# Patient Record
Sex: FEMALE | Race: WHITE | NOT HISPANIC OR LATINO | URBAN - METROPOLITAN AREA
[De-identification: names, ages, dates, MRNs, and addresses within clinical notes are randomized per-mention and may not be internally consistent; named-entity substitution may affect disease eponyms.]

---

## 2017-02-01 ENCOUNTER — OUTPATIENT (OUTPATIENT)
Dept: OUTPATIENT SERVICES | Facility: HOSPITAL | Age: 22
LOS: 1 days | End: 2017-02-01
Payer: MEDICAID

## 2017-02-20 DIAGNOSIS — R69 ILLNESS, UNSPECIFIED: ICD-10-CM

## 2017-09-01 PROCEDURE — G9001: CPT

## 2023-10-03 PROBLEM — Z00.00 ENCOUNTER FOR PREVENTIVE HEALTH EXAMINATION: Status: ACTIVE | Noted: 2023-10-03

## 2023-10-06 ENCOUNTER — NON-APPOINTMENT (OUTPATIENT)
Age: 28
End: 2023-10-06

## 2023-10-06 ENCOUNTER — APPOINTMENT (OUTPATIENT)
Dept: OBGYN | Facility: CLINIC | Age: 28
End: 2023-10-06
Payer: MEDICAID

## 2023-10-06 VITALS
BODY MASS INDEX: 20.38 KG/M2 | HEART RATE: 124 BPM | DIASTOLIC BLOOD PRESSURE: 76 MMHG | SYSTOLIC BLOOD PRESSURE: 110 MMHG | HEIGHT: 63 IN | WEIGHT: 115 LBS

## 2023-10-06 DIAGNOSIS — Z12.4 ENCOUNTER FOR SCREENING FOR MALIGNANT NEOPLASM OF CERVIX: ICD-10-CM

## 2023-10-06 DIAGNOSIS — Z11.51 ENCOUNTER FOR SCREENING FOR HUMAN PAPILLOMAVIRUS (HPV): ICD-10-CM

## 2023-10-06 DIAGNOSIS — Z01.419 ENCOUNTER FOR GYNECOLOGICAL EXAMINATION (GENERAL) (ROUTINE) W/OUT ABNORMAL FINDINGS: ICD-10-CM

## 2023-10-06 PROCEDURE — 76830 TRANSVAGINAL US NON-OB: CPT

## 2023-10-06 PROCEDURE — 99214 OFFICE O/P EST MOD 30 MIN: CPT

## 2023-10-09 LAB
C TRACH RRNA SPEC QL NAA+PROBE: NOT DETECTED
N GONORRHOEA RRNA SPEC QL NAA+PROBE: NOT DETECTED
SOURCE AMPLIFICATION: NORMAL

## 2023-10-18 ENCOUNTER — ASOB RESULT (OUTPATIENT)
Age: 28
End: 2023-10-18

## 2023-10-18 ENCOUNTER — APPOINTMENT (OUTPATIENT)
Dept: OBGYN | Facility: CLINIC | Age: 28
End: 2023-10-18
Payer: MEDICAID

## 2023-10-18 DIAGNOSIS — R10.2 PELVIC AND PERINEAL PAIN: ICD-10-CM

## 2023-10-18 LAB
BILIRUB UR QL STRIP: NORMAL
CLARITY UR: CLEAR
COLLECTION METHOD: NORMAL
GLUCOSE UR-MCNC: NORMAL
HCG UR QL: 0.2 EU/DL
HGB UR QL STRIP.AUTO: NORMAL
KETONES UR-MCNC: NORMAL
LEUKOCYTE ESTERASE UR QL STRIP: NORMAL
NITRITE UR QL STRIP: NORMAL
PH UR STRIP: 6
PROT UR STRIP-MCNC: NORMAL
SP GR UR STRIP: 1.01

## 2023-10-18 PROCEDURE — 99213 OFFICE O/P EST LOW 20 MIN: CPT

## 2023-10-18 PROCEDURE — 81002 URINALYSIS NONAUTO W/O SCOPE: CPT

## 2023-10-31 ENCOUNTER — APPOINTMENT (OUTPATIENT)
Dept: OBGYN | Facility: CLINIC | Age: 28
End: 2023-10-31
Payer: MEDICAID

## 2023-10-31 VITALS
HEIGHT: 63 IN | HEART RATE: 102 BPM | SYSTOLIC BLOOD PRESSURE: 100 MMHG | WEIGHT: 115 LBS | BODY MASS INDEX: 20.38 KG/M2 | DIASTOLIC BLOOD PRESSURE: 67 MMHG

## 2023-10-31 DIAGNOSIS — N92.6 IRREGULAR MENSTRUATION, UNSPECIFIED: ICD-10-CM

## 2023-10-31 LAB
BILIRUB UR QL STRIP: NORMAL
CLARITY UR: CLEAR
COLLECTION METHOD: NORMAL
GLUCOSE UR-MCNC: NORMAL
HCG UR QL: 0.2 EU/DL
HGB UR QL STRIP.AUTO: NORMAL
KETONES UR-MCNC: NORMAL
LEUKOCYTE ESTERASE UR QL STRIP: NORMAL
NITRITE UR QL STRIP: NORMAL
PH UR STRIP: 6.5
PROT UR STRIP-MCNC: NORMAL
SP GR UR STRIP: 1.02

## 2023-10-31 PROCEDURE — 99213 OFFICE O/P EST LOW 20 MIN: CPT

## 2023-10-31 PROCEDURE — 81002 URINALYSIS NONAUTO W/O SCOPE: CPT

## 2023-10-31 PROCEDURE — 76830 TRANSVAGINAL US NON-OB: CPT

## 2023-11-03 ENCOUNTER — TRANSCRIPTION ENCOUNTER (OUTPATIENT)
Age: 28
End: 2023-11-03

## 2023-11-14 ENCOUNTER — LABORATORY RESULT (OUTPATIENT)
Age: 28
End: 2023-11-14

## 2023-11-15 DIAGNOSIS — R39.9 UNSPECIFIED SYMPTOMS AND SIGNS INVOLVING THE GENITOURINARY SYSTEM: ICD-10-CM

## 2023-11-17 LAB
ABO + RH PNL BLD: NORMAL
APPEARANCE: CLEAR
BILIRUBIN URINE: NEGATIVE
BLOOD URINE: NEGATIVE
COLOR: YELLOW
GLUCOSE BS SERPL-MCNC: 122 MG/DL
GLUCOSE QUALITATIVE U: NEGATIVE MG/DL
HBV SURFACE AG SER QL: NONREACTIVE
HCV AB SER QL: NONREACTIVE
HCV S/CO RATIO: 0.08 S/CO
HGB A MFR BLD: 97.1 %
HGB A2 MFR BLD: 2.9 %
HGB FRACT BLD-IMP: NORMAL
KETONES URINE: NEGATIVE MG/DL
LEUKOCYTE ESTERASE URINE: ABNORMAL
MEV IGG FLD QL IA: 11.5 AU/ML
MEV IGG+IGM SER-IMP: NEGATIVE
NITRITE URINE: NEGATIVE
PH URINE: 6
PROTEIN URINE: NORMAL MG/DL
RPR SER-TITR: NORMAL
RUBV IGG FLD-ACNC: 1.1 INDEX
RUBV IGG SER-IMP: POSITIVE
RUBV IGM FLD-ACNC: <20 AU/ML
SPECIFIC GRAVITY URINE: 1.02
T3FREE SERPL-MCNC: 3.22 PG/ML
T4 FREE SERPL-MCNC: 1.1 NG/DL
TSH SERPL-ACNC: 2.04 UIU/ML
UROBILINOGEN URINE: 0.2 MG/DL
VZV AB TITR SER: POSITIVE
VZV IGG SER IF-ACNC: 2015 INDEX
VZV IGM SER IF-ACNC: <0.91 INDEX

## 2023-11-19 ENCOUNTER — NON-APPOINTMENT (OUTPATIENT)
Age: 28
End: 2023-11-19

## 2023-11-20 ENCOUNTER — NON-APPOINTMENT (OUTPATIENT)
Age: 28
End: 2023-11-20

## 2023-11-20 LAB
B19V IGG SER QL IA: NEGATIVE
B19V IGG+IGM SER-IMP: NORMAL
B19V IGM FLD-ACNC: NEGATIVE
MEV IGM SER QL: 0.2 AU

## 2023-11-21 LAB
AR GENE MUT ANL BLD/T: NORMAL
CFTR MUT TESTED BLD/T: NEGATIVE

## 2023-11-29 ENCOUNTER — NON-APPOINTMENT (OUTPATIENT)
Age: 28
End: 2023-11-29

## 2023-11-29 ENCOUNTER — APPOINTMENT (OUTPATIENT)
Dept: ANTEPARTUM | Facility: CLINIC | Age: 28
End: 2023-11-29
Payer: MEDICAID

## 2023-11-29 ENCOUNTER — ASOB RESULT (OUTPATIENT)
Age: 28
End: 2023-11-29

## 2023-11-29 DIAGNOSIS — N91.2 AMENORRHEA, UNSPECIFIED: ICD-10-CM

## 2023-11-29 PROCEDURE — 76813 OB US NUCHAL MEAS 1 GEST: CPT

## 2023-11-29 PROCEDURE — 36416 COLLJ CAPILLARY BLOOD SPEC: CPT

## 2023-11-30 ENCOUNTER — NON-APPOINTMENT (OUTPATIENT)
Age: 28
End: 2023-11-30

## 2023-12-01 ENCOUNTER — LABORATORY RESULT (OUTPATIENT)
Age: 28
End: 2023-12-01

## 2023-12-01 ENCOUNTER — APPOINTMENT (OUTPATIENT)
Dept: OBGYN | Facility: CLINIC | Age: 28
End: 2023-12-01
Payer: MEDICAID

## 2023-12-01 VITALS
HEIGHT: 63 IN | DIASTOLIC BLOOD PRESSURE: 75 MMHG | SYSTOLIC BLOOD PRESSURE: 110 MMHG | BODY MASS INDEX: 21.44 KG/M2 | WEIGHT: 121 LBS

## 2023-12-01 DIAGNOSIS — Z11.3 ENCOUNTER FOR SCREENING FOR INFECTIONS WITH A PREDOMINANTLY SEXUAL MODE OF TRANSMISSION: ICD-10-CM

## 2023-12-01 LAB
CLARITY UR: CLEAR
COLLECTION METHOD: NORMAL
GLUCOSE UR-MCNC: NORMAL
LEUKOCYTE ESTERASE UR QL STRIP: NORMAL
NITRITE UR QL STRIP: NORMAL
PROT UR STRIP-MCNC: NORMAL

## 2023-12-01 PROCEDURE — 0500F INITIAL PRENATAL CARE VISIT: CPT

## 2023-12-01 RX ORDER — NITROFURANTOIN (MONOHYDRATE/MACROCRYSTALS) 25; 75 MG/1; MG/1
100 CAPSULE ORAL
Qty: 10 | Refills: 0 | Status: COMPLETED | COMMUNITY
Start: 2023-11-15 | End: 2023-11-23

## 2023-12-01 RX ORDER — PRENATAL VIT 49/IRON FUM/FOLIC 6.75-0.2MG
TABLET ORAL
Refills: 0 | Status: ACTIVE | COMMUNITY

## 2023-12-04 LAB
11-BETA-HYDROXYLASE-DEFICIENT CONGENITAL ADRENAL HYPERPLASIA: NEGATIVE
6-PYRUVOYL-TETRAHYDROPTERIN SYNTHASE DEFICIENCY: NEGATIVE
ABCC8-RELATED HYPERINSULINISM: NEGATIVE
ADENOSINE DEAMINASE DEFICIENCY: NEGATIVE
ALPHA THALASSEMIA: NEGATIVE
ALPHA-MANNOSIDOSIS: NEGATIVE
ALSTROM SYNDROME: NEGATIVE
AMT-RELATED GLYCINE ENCEPHALOPATHY: NEGATIVE
ANDERMANN SYNDROME: NEGATIVE
AR GENE MUT ANL BLD/T: NEGATIVE
ARGININEMIA: NEGATIVE
ARGININOSUCCINIC ACIDURIA: NEGATIVE
ARSACS: NEGATIVE
ASPARTYLGLYCOSAMINURIA: NEGATIVE
ATAXIA WITH VITAMIN E DEFICIENCY: NEGATIVE
ATAXIA-TELANGIECTASIA: NEGATIVE
ATP7A-RELATED DISORDERS: NEGATIVE
AUTOSOMAL RECESSIVE OSTEOPETROSIS TYPE 1: NEGATIVE
AUTOSOMAL RECESSIVE POLYCYSTIC KIDNEY DISEASE: NEGATIVE
BACTERIA UR CULT: NORMAL
BARDET-BIEDL SYNDROME, BBS1-RELATED: NEGATIVE
BARDET-BIEDL SYNDROME, BBS10-RELATED: NEGATIVE
BARDET-BIEDL SYNDROME, BBS12-RELATED: NEGATIVE
BARDET-BIEDL SYNDROME, BBS2-RELATED: NEGATIVE
BIOTINIDASE DEFICIENCY: NEGATIVE
BLOOM SYNDROME: NEGATIVE
CALPAINOPATHY: NEGATIVE
CANAVAN DISEASE: NEGATIVE
CARBAMOYLPHOSPHATE SYNTHETASE I DEFICIENCY: NEGATIVE
CARNITINE PALMITOYLTRANSFERASE IA DEFICIENCY: NEGATIVE
CARNITINE PALMITOYLTRANSFERASE II DEFICIENCY: NEGATIVE
CARTILAGE-HAIR HYPOPLASIA: NEGATIVE
CEREBROTENDINOUS XANTHOMATOSIS: NEGATIVE
CITRULLINEMIA TYPE 1: NEGATIVE
CLN3-RELATED NEURONAL CEROID LIPOFUSCINOSIS: NEGATIVE
CLN5-RELATED NEURONAL CEROID LIPOFUSCINOSIS: NEGATIVE
CLN6-RELATED NEURONAL CEROID LIPOFUSCINOSIS: NEGATIVE
CMV IGG SERPL QL: 9.4 U/ML
CMV IGG SERPL-IMP: POSITIVE
CMV IGM SERPL QL: <8 AU/ML
CMV IGM SERPL QL: NEGATIVE
COHEN SYNDROME: NEGATIVE
COL4A3-RELATED ALPORT SYNDROME: NEGATIVE
COL4A4-RELATED ALPORT SYNDROME: NEGATIVE
CONGENITAL ADRENAL HYPERPLASIA: NEGATIVE
CONGENITAL DISORDER OF GLYCOSYLATION TYPE IA: NEGATIVE
CONGENITAL DISORDER OF GLYCOSYLATION TYPE IB: NEGATIVE
CONGENITAL DISORDER OF GLYCOSYLATION TYPE IC: NEGATIVE
CONGENITAL FINNISH NEPHROSIS: NEGATIVE
COSTEFF OPTIC ATROPHY SYNDROME: NEGATIVE
CYSTIC FIBROSIS: NEGATIVE
CYSTINOSIS: NEGATIVE
D-BIFUNCTIONAL PROTEIN DEFICIENCY: NEGATIVE
DELTA-SARCOGLYCANOPATHY: NEGATIVE
DYS GENE MUT TESTED BLD/T: NEGATIVE
DYSFERLINOPATHY: NEGATIVE
DYSTROPHINOPATHY (INCLUDING DUCHENNE/BECKER MUSCULAR DYSTROP: NEGATIVE
ERCC6-RELATED DISORDERS: NEGATIVE
ERCC8-RELATED DISORDERS: NEGATIVE
ESTIMATED AVERAGE GLUCOSE: 91 MG/DL
EVC-RELATED ELLIS-VAN CREVELD SYNDROME: NEGATIVE
EVC2-RELATED ELLIS-VAN CREVELD SYNDROME: NEGATIVE
FABRY DISEASE: NEGATIVE
FAMILIAL MEDITERRANEAN FEVER: NEGATIVE
FANCONI ANEMIA COMPLEMENTATION GROUP A: NEGATIVE
FANCONI ANEMIA TYPE C: NEGATIVE
FKRP-RELATED DISORDERS: NEGATIVE
FRAGILE X SYNDROME: NEGATIVE
GALACTOKINASE DEFICIENCY: NEGATIVE
GALACTOSEMIA: NEGATIVE
GAMMA-SARCOGLYCANOPATHY: NEGATIVE
GAUCHER DISEASE: NEGATIVE
GJB2-RELATED DFNB1 NONSYNDROMIC HEARING LOSS AND DEAFNESS: NEGATIVE
GLB1-RELATED DISORDERS: NEGATIVE
GLDC-RELATED GLYCINE ENCEPHALOPATHY: NEGATIVE
GLUTARIC ACIDEMIA TYPE 1: NEGATIVE
GLYCOGEN STORAGE DISEASE TYPE IA: NEGATIVE
GLYCOGEN STORAGE DISEASE TYPE IB: NEGATIVE
GLYCOGEN STORAGE DISEASE TYPE III: NEGATIVE
GNPTAB-RELATED DISORDERS: NEGATIVE
GRACILE SYNDROME: NEGATIVE
HB BETA CHAIN-RELATED HEMOGLOBINOPATHY: NEGATIVE
HBA1C MFR BLD HPLC: 4.8 %
HEREDITARY FRUCTOSE INTOLERANCE: NEGATIVE
HERLITZ JUNCTIONAL EPIDERMOLYSIS BULLOSA, LAMA3-RELATED: NEGATIVE
HERLITZ JUNCTIONAL EPIDERMOLYSIS BULLOSA, LAMB3-RELATED: NEGATIVE
HERLITZ JUNCTIONAL EPIDERMOLYSIS BULLOSA, LAMC2-RELATED: NEGATIVE
HEXOSAMINIDASE A DEFICIENCY: NEGATIVE
HIV1+2 AB SPEC QL IA.RAPID: NONREACTIVE
HMG-COA LYASE DEFICIENCY: NEGATIVE
HOLOCARBOXYLASE SYNTHETASE DEFICIENCY: NEGATIVE
HOMOCYSTINURIA / CYSTATHIONINE BETA-SYNTHASE DEFICIENCY: NEGATIVE
HURLER SYNDROME: NEGATIVE
HYDROLETHALUS SYNDROME: NEGATIVE
HYPOPHOSPHATASIA, AUTOSOMAL RECESSIVE: NEGATIVE
INCLUSION BODY MYOPATHY 2: NEGATIVE
ISOVALERIC ACIDEMIA: NEGATIVE
JOUBERT SYNDROME 2: NEGATIVE
KCNJ11-RELATED FAMILIAL HYPERINSULINISM: NEGATIVE
KRABBE DISEASE: NEGATIVE
LAMA2-RELATED MUSCULAR DYSTROPHY: NEGATIVE
LEIGH SYNDROME, FRENCH-CANADIAN TYPE: NEGATIVE
LIMB-GIRDLE MUSCULAR DYSTROPHY TYPE 2D: NEGATIVE
LIMB-GIRDLE MUSCULAR DYSTROPHY TYPE 2E: NEGATIVE
LIPOAMIDE DEHYDROGENASE DEFICIENCY: NEGATIVE
LIPOID CONGENITAL ADRENAL HYPERPLASIA: NEGATIVE
LONG CHAIN 3-HYDROXYACYL-COA DEHYDROGENASE DEFICIENCY: NEGATIVE
LYSOSOMAL ACID LIPASE DEFICIENCY: NEGATIVE
MAPLE SYRUP URINE DISEASE TYPE 1B: NEGATIVE
MAPLE SYRUP URINE DISEASE TYPE IA: NEGATIVE
MAPLE SYRUP URINE DISEASE TYPE II: NEGATIVE
MEDIUM CHAIN ACYL-COA DEHYDROGENASE DEFICIENCY: NEGATIVE
MEGALENCEPHALIC LEUKOENCEPHALOPATHY WITH SUBCORTICAL CYSTS: NEGATIVE
METACHROMATIC LEUKODYSTROPHY: NEGATIVE
METHYLMALONIC ACIDEMIA, CBLA TYPE: NEGATIVE
METHYLMALONIC ACIDEMIA, CBLB TYPE: NEGATIVE
METHYLMALONIC ACIDURIA AND HOMOCYSTINURIA, CBLC TYPE: NEGATIVE
MKS1-RELATED DISORDERS: NEGATIVE
MUCOLIPIDOSIS III GAMMA: NEGATIVE
MUCOLIPIDOSIS IV: NEGATIVE
MUCOPOLYSACCHARIDOSIS TYPE II: NEGATIVE
MUCOPOLYSACCHARIDOSIS TYPE IIIA: NEGATIVE
MUCOPOLYSACCHARIDOSIS TYPE IIIB: NEGATIVE
MUCOPOLYSACCHARIDOSIS TYPE IIIC: NEGATIVE
MUSCLE-EYE-BRAIN DISEASE: NEGATIVE
MUT-RELATED METHYLMALONIC ACIDEMIA: NEGATIVE
MYO7A-RELATED DISORDERS: NEGATIVE
NEB-RELATED NEMALINE MYOPATHY: NEGATIVE
NIEMANN-PICK DISEASE TYPE C2: NEGATIVE
NIEMANN-PICK DISEASE TYPE C: NEGATIVE
NIEMANN-PICK DISEASE, SMPD1-ASSOCIATED: NEGATIVE
NIJMEGEN BREAKAGE SYNDROME: NEGATIVE
NORTHERN EPILEPSY: NEGATIVE
ORNITHINE TRANSCARBAMYLASE DEFICIENCY: NEGATIVE
PCCA-RELATED PROPIONIC ACIDEMIA: NEGATIVE
PCCB-RELATED PROPIONIC ACIDEMIA: NEGATIVE
PENDRED SYNDROME: NEGATIVE
PEROXISOME BIOGENESIS DISORDER TYPE 3: NEGATIVE
PEROXISOME BIOGENESIS DISORDER TYPE 4: NEGATIVE
PEROXISOME BIOGENESIS DISORDER TYPE 5: NEGATIVE
PEROXISOME BIOGENESIS DISORDER TYPE 6: NEGATIVE
PEX1-RELATED ZELLWEGER SYNDROME SPECTRUM: NEGATIVE
PHENYLALANINE HYDROXYLASE DEFICIENCY: NEGATIVE
POLYGLANDULAR AUTOIMMUNE SYNDROME TYPE 1: NEGATIVE
POMPE DISEASE: POSITIVE
PPT1-RELATED NEURONAL CEROID LIPOFUSCINOSIS: NEGATIVE
PRIMARY CARNITINE DEFICIENCY: NEGATIVE
PRIMARY HYPEROXALURIA TYPE 1: NEGATIVE
PRIMARY HYPEROXALURIA TYPE 2: NEGATIVE
PRIMARY HYPEROXALURIA TYPE 3: NEGATIVE
PROP1-RELATED COMBINED PITUITARY HORMONE DEFICIENCY: NEGATIVE
PYCNODYSOSTOSIS: NEGATIVE
PYRUVATE CARBOXYLASE DEFICIENCY: NEGATIVE
RHIZOMELIC CHONDRODYSPLASIA PUNCTATA TYPE 1: NEGATIVE
RTEL1-RELATED DISORDERS: NEGATIVE
SALLA DISEASE: NEGATIVE
SANDHOFF DISEASE: NEGATIVE
SEGAWA SYNDROME: NEGATIVE
SHORT CHAIN ACYL-COA DEHYDROGENASE DEFICIENCY: NEGATIVE
SJOGREN-LARSSON SYNDROME: NEGATIVE
SMITH-LEMLI-OPITZ SYNDROME: NEGATIVE
SPASTIC PARAPLEGIA TYPE 15: NEGATIVE
SPONDYLOTHORACIC DYSOSTOSIS: NEGATIVE
STEROID-RESISTANT NEPHROTIC SYNDROME: POSITIVE
SULFATE TRANSPORTER-RELATED OSTEOCHONDRODYSPLASIA: NEGATIVE
T GONDII AB SER-IMP: NEGATIVE
T GONDII AB SER-IMP: NEGATIVE
T GONDII IGG SER QL: <3 IU/ML
T GONDII IGM SER QL: <3 AU/ML
TGM1-RELATED AUTOSOMAL RECESSIVE CONGENITAL ICHTHYOSIS: NEGATIVE
TPP1-RELATED NEURONAL CEROID LIPOFUSCINOSIS: NEGATIVE
TYROSINEMIA TYPE I: NEGATIVE
TYROSINEMIA TYPE II: NEGATIVE
USH1C-RELATED DISORDERS: NEGATIVE
USH2A-RELATED DISORDERS: NEGATIVE
USHER SYNDROME TYPE 1F: NEGATIVE
USHER SYNDROME TYPE 3: NEGATIVE
VERY LONG CHAIN ACYL-COA DEHYDROGENASE DEFICIENCY: NEGATIVE
WALKER-WARBURG SYNDROME: NEGATIVE
WILSON DISEASE: NEGATIVE
X-LINKED ADRENOLEUKODYSTROPHY: NEGATIVE
X-LINKED ALPORT SYNDROME: NEGATIVE
X-LINKED CONGENITAL ADRENAL HYPOPLASIA: NEGATIVE
X-LINKED JUVENILE RETINOSCHISIS: NEGATIVE
X-LINKED MYOTUBULAR MYOPATHY: NEGATIVE
X-LINKED SEVERE COMBINED IMMUNODEFICIENCY: NEGATIVE
XERODERMA PIGMENTOSUM GROUP A: NEGATIVE
XERODERMA PIGMENTOSUM GROUP C: NEGATIVE

## 2023-12-05 ENCOUNTER — ASOB RESULT (OUTPATIENT)
Age: 28
End: 2023-12-05

## 2023-12-05 ENCOUNTER — APPOINTMENT (OUTPATIENT)
Dept: MATERNAL FETAL MEDICINE | Facility: CLINIC | Age: 28
End: 2023-12-05
Payer: MEDICAID

## 2023-12-05 LAB
ADDITIONAL US: NORMAL
AMPHET UR-MCNC: NEGATIVE NG/ML
BARBITURATES UR-MCNC: NEGATIVE NG/ML
BENZODIAZ UR-MCNC: NEGATIVE NG/ML
COCAINE METAB.OTHER UR-MCNC: NEGATIVE NG/ML
COMMENTS: AFP: NORMAL
CREATININE, URINE: 48.5 MG/DL
CRL SCAN TWIN B: NORMAL
CRL SCAN: NORMAL
CROWN RUMP LENGTH TWIN B: NORMAL
CROWN RUMP LENGTH: 52.6 MM
DOWN SYNDROME AGE RISK: NORMAL
DOWN SYNDROME INTERPRETATION: NORMAL
DOWN SYNDROME SCREENING RISK: NORMAL
FENTANYL, URINE: NEGATIVE NG/ML
GEST. AGE ON COLLECTION DATE: 11.7 WEEKS
HCG MOM: 0.55
HCG VALUE: 71.4 IU/ML
MATERNAL AGE AT EDD: 31 YR
METHADONE UR-MCNC: NEGATIVE NG/ML
NOTE: AFP: NORMAL
NT MOM TWIN B: NORMAL
NT TWIN B: NORMAL
NUCHAL TRANSLUCENCY (NT): 1.5 MM
NUCHAL TRANSLUCENCY MOM: 1.08
NUMBER OF FETUSES: 1
OPIATES UR-MCNC: NEGATIVE NG/ML
OXYCODONE/OXYMORPHONE, URINE: NEGATIVE NG/ML
PAPP-A MOM: 0.69
PAPP-A VALUE: 661.1 NG/ML
PCP UR-MCNC: NEGATIVE NG/ML
PH, URINE: 5.9
PLEASE NOTE: DRUGSCRUR: NORMAL
RACE: NORMAL
RESULTS AFP: NORMAL
SONOGRAPHER ID#: NORMAL
SUBMIT PART 2 SAMPLE USING: NORMAL
TEST RESULTS: AFP: NORMAL
THC UR QL: NEGATIVE NG/ML
TRISOMY 18 AGE RISK: NORMAL
TRISOMY 18 INTERPRETATION: NORMAL
TRISOMY 18 SCREENING RISK: NORMAL
WEIGHT AFP: 120 LBS

## 2023-12-05 PROCEDURE — 99442: CPT

## 2023-12-06 LAB — LEAD BLD-MCNC: <1 UG/DL

## 2023-12-07 ENCOUNTER — NON-APPOINTMENT (OUTPATIENT)
Age: 28
End: 2023-12-07

## 2023-12-19 ENCOUNTER — NON-APPOINTMENT (OUTPATIENT)
Age: 28
End: 2023-12-19

## 2023-12-21 ENCOUNTER — NON-APPOINTMENT (OUTPATIENT)
Age: 28
End: 2023-12-21

## 2023-12-27 ENCOUNTER — APPOINTMENT (OUTPATIENT)
Dept: ANTEPARTUM | Facility: CLINIC | Age: 28
End: 2023-12-27
Payer: MEDICAID

## 2023-12-27 PROCEDURE — 36415 COLL VENOUS BLD VENIPUNCTURE: CPT

## 2023-12-29 ENCOUNTER — APPOINTMENT (OUTPATIENT)
Dept: OBGYN | Facility: CLINIC | Age: 28
End: 2023-12-29
Payer: MEDICAID

## 2023-12-29 VITALS
BODY MASS INDEX: 21.62 KG/M2 | WEIGHT: 122 LBS | SYSTOLIC BLOOD PRESSURE: 107 MMHG | HEIGHT: 63 IN | DIASTOLIC BLOOD PRESSURE: 73 MMHG

## 2023-12-29 LAB
CLARITY UR: CLEAR
COLLECTION METHOD: NORMAL
GLUCOSE UR-MCNC: NEGATIVE
LEUKOCYTE ESTERASE UR QL STRIP: NEGATIVE
NITRITE UR QL STRIP: NEGATIVE
PROT UR STRIP-MCNC: NEGATIVE

## 2023-12-29 PROCEDURE — 0502F SUBSEQUENT PRENATAL CARE: CPT

## 2023-12-31 PROBLEM — Z11.3 ENCOUNTER FOR SCREENING EXAMINATION FOR SEXUALLY TRANSMITTED DISEASE: Status: ACTIVE | Noted: 2023-10-06

## 2024-01-02 LAB
ADDITIONAL US: NORMAL
AFP MOM: 0.71
AFP VALUE: 24.5 NG/ML
COLLECTED ON 2: NORMAL
COLLECTED ON: NORMAL
CRL SCAN TWIN B: NORMAL
CRL SCAN: NORMAL
CROWN RUMP LENGTH TWIN B: NORMAL
CROWN RUMP LENGTH: 52.6 MM
DIA MOM: 0.73
DIA VALUE: 130.7 PG/ML
DOWN SYNDROME AGE RISK: NORMAL
DOWN SYNDROME INTERPRETATION: NORMAL
DOWN SYNDROME SCREENING RISK: NORMAL
FIRST TRIMESTER SAMPLE: NORMAL
GEST. AGE ON COLLECTION DATE: 11.7 WEEKS
GESTATIONAL AGE: 15.7 WEEKS
HCG MOM: 0.55
HCG VALUE: 24.9 IU/ML
INSULIN DEP DIABETES: NO
MATERNAL AGE AT EDD: 31 YR
NT MOM TWIN B: NORMAL
NT TWIN B: NORMAL
NUCHAL TRANSLUCENCY (NT): 1.5 MM
NUCHAL TRANSLUCENCY MOM: 1.08
NUMBER OF FETUSES: 1
OPEN SPINA BIFIDA: NORMAL
OSB INTERPRETATION: NORMAL
PAPP-A MOM: 0.69
PAPP-A VALUE: 661.1 NG/ML
RACE: NORMAL
SECOND TRIMESTER SAMPLE: NORMAL
SEQUENTIAL 2 COMMENTS: NORMAL
SEQUENTIAL 2 NOTE: NORMAL
SEQUENTIAL 2 RESULTS: NORMAL
SEQUENTIAL 2 TEST RESULTS: NORMAL
SONOGRAPHER ID#: NORMAL
TRISOMY 18 AGE RISK: NORMAL
TRISOMY 18 INTERPRETATION: NORMAL
TRISOMY 18 SCREENING RISK: NORMAL
UE3 MOM: 1.28
UE3 VALUE: 1.18 NG/ML
WEIGHT AFP: 120 LBS
WEIGHT: 123 LBS

## 2024-01-24 ENCOUNTER — ASOB RESULT (OUTPATIENT)
Age: 29
End: 2024-01-24

## 2024-01-24 ENCOUNTER — APPOINTMENT (OUTPATIENT)
Dept: ANTEPARTUM | Facility: CLINIC | Age: 29
End: 2024-01-24
Payer: MEDICAID

## 2024-01-24 PROCEDURE — 76805 OB US >/= 14 WKS SNGL FETUS: CPT

## 2024-01-25 ENCOUNTER — NON-APPOINTMENT (OUTPATIENT)
Age: 29
End: 2024-01-25

## 2024-01-26 ENCOUNTER — APPOINTMENT (OUTPATIENT)
Dept: OBGYN | Facility: CLINIC | Age: 29
End: 2024-01-26
Payer: MEDICAID

## 2024-01-26 VITALS
WEIGHT: 131 LBS | DIASTOLIC BLOOD PRESSURE: 71 MMHG | BODY MASS INDEX: 23.21 KG/M2 | SYSTOLIC BLOOD PRESSURE: 104 MMHG | HEIGHT: 63 IN | HEART RATE: 105 BPM

## 2024-01-26 DIAGNOSIS — R82.998 OTHER ABNORMAL FINDINGS IN URINE: ICD-10-CM

## 2024-01-26 LAB
CLARITY UR: CLEAR
COLLECTION METHOD: NORMAL
GLUCOSE UR-MCNC: NEGATIVE
LEUKOCYTE ESTERASE UR QL STRIP: NORMAL
NITRITE UR QL STRIP: NEGATIVE
PROT UR STRIP-MCNC: NORMAL

## 2024-01-26 PROCEDURE — 0502F SUBSEQUENT PRENATAL CARE: CPT

## 2024-01-29 LAB — BACTERIA UR CULT: NORMAL

## 2024-02-07 ENCOUNTER — ASOB RESULT (OUTPATIENT)
Age: 29
End: 2024-02-07

## 2024-02-07 ENCOUNTER — APPOINTMENT (OUTPATIENT)
Dept: ANTEPARTUM | Facility: CLINIC | Age: 29
End: 2024-02-07
Payer: MEDICAID

## 2024-02-07 PROCEDURE — 76816 OB US FOLLOW-UP PER FETUS: CPT

## 2024-02-22 ENCOUNTER — NON-APPOINTMENT (OUTPATIENT)
Age: 29
End: 2024-02-22

## 2024-02-23 ENCOUNTER — APPOINTMENT (OUTPATIENT)
Dept: OBGYN | Facility: CLINIC | Age: 29
End: 2024-02-23
Payer: MEDICAID

## 2024-02-23 VITALS
BODY MASS INDEX: 24.63 KG/M2 | WEIGHT: 139 LBS | HEIGHT: 63 IN | SYSTOLIC BLOOD PRESSURE: 104 MMHG | DIASTOLIC BLOOD PRESSURE: 71 MMHG

## 2024-02-23 LAB
GLUCOSE UR-MCNC: NEGATIVE
LEUKOCYTE ESTERASE UR QL STRIP: NEGATIVE
NITRITE UR QL STRIP: NEGATIVE
PROT UR STRIP-MCNC: NEGATIVE

## 2024-02-23 PROCEDURE — 0502F SUBSEQUENT PRENATAL CARE: CPT

## 2024-03-04 ENCOUNTER — NON-APPOINTMENT (OUTPATIENT)
Age: 29
End: 2024-03-04

## 2024-03-04 LAB
BASOPHILS # BLD AUTO: 0.02 K/UL
BASOPHILS NFR BLD AUTO: 0.3 %
EOSINOPHIL # BLD AUTO: 0.08 K/UL
EOSINOPHIL NFR BLD AUTO: 1 %
HCT VFR BLD CALC: 30.9 %
HGB BLD-MCNC: 10 G/DL
IMM GRANULOCYTES NFR BLD AUTO: 0.5 %
LYMPHOCYTES # BLD AUTO: 1.5 K/UL
LYMPHOCYTES NFR BLD AUTO: 19.2 %
MAN DIFF?: NORMAL
MCHC RBC-ENTMCNC: 29.1 PG
MCHC RBC-ENTMCNC: 32.4 GM/DL
MCV RBC AUTO: 89.8 FL
MONOCYTES # BLD AUTO: 0.66 K/UL
MONOCYTES NFR BLD AUTO: 8.5 %
NEUTROPHILS # BLD AUTO: 5.5 K/UL
NEUTROPHILS NFR BLD AUTO: 70.5 %
PLATELET # BLD AUTO: 341 K/UL
RBC # BLD: 3.44 M/UL
RBC # FLD: 13.2 %
WBC # FLD AUTO: 7.8 K/UL

## 2024-03-08 ENCOUNTER — OUTPATIENT (OUTPATIENT)
Dept: OUTPATIENT SERVICES | Facility: HOSPITAL | Age: 29
LOS: 1 days | End: 2024-03-08
Payer: MEDICAID

## 2024-03-08 ENCOUNTER — NON-APPOINTMENT (OUTPATIENT)
Age: 29
End: 2024-03-08

## 2024-03-08 VITALS
DIASTOLIC BLOOD PRESSURE: 62 MMHG | SYSTOLIC BLOOD PRESSURE: 95 MMHG | TEMPERATURE: 99 F | HEART RATE: 101 BPM | RESPIRATION RATE: 18 BRPM

## 2024-03-08 VITALS
DIASTOLIC BLOOD PRESSURE: 63 MMHG | RESPIRATION RATE: 18 BRPM | HEART RATE: 115 BPM | TEMPERATURE: 99 F | SYSTOLIC BLOOD PRESSURE: 91 MMHG

## 2024-03-08 VITALS
TEMPERATURE: 99 F | HEART RATE: 115 BPM | DIASTOLIC BLOOD PRESSURE: 63 MMHG | RESPIRATION RATE: 18 BRPM | SYSTOLIC BLOOD PRESSURE: 91 MMHG

## 2024-03-08 DIAGNOSIS — O26.899 OTHER SPECIFIED PREGNANCY RELATED CONDITIONS, UNSPECIFIED TRIMESTER: ICD-10-CM

## 2024-03-08 PROCEDURE — 96360 HYDRATION IV INFUSION INIT: CPT

## 2024-03-08 PROCEDURE — 59025 FETAL NON-STRESS TEST: CPT

## 2024-03-08 PROCEDURE — G0463: CPT

## 2024-03-08 PROCEDURE — 99221 1ST HOSP IP/OBS SF/LOW 40: CPT

## 2024-03-08 RX ORDER — SODIUM CHLORIDE 9 MG/ML
1000 INJECTION, SOLUTION INTRAVENOUS ONCE
Refills: 0 | Status: COMPLETED | OUTPATIENT
Start: 2024-03-08 | End: 2024-03-08

## 2024-03-08 RX ORDER — ONDANSETRON 8 MG/1
4 TABLET, FILM COATED ORAL ONCE
Refills: 0 | Status: DISCONTINUED | OUTPATIENT
Start: 2024-03-08 | End: 2024-03-08

## 2024-03-08 RX ORDER — FAMOTIDINE 10 MG/ML
20 INJECTION INTRAVENOUS ONCE
Refills: 0 | Status: DISCONTINUED | OUTPATIENT
Start: 2024-03-08 | End: 2024-03-08

## 2024-03-08 RX ADMIN — SODIUM CHLORIDE 1000 MILLILITER(S): 9 INJECTION, SOLUTION INTRAVENOUS at 16:21

## 2024-03-08 NOTE — OB PROVIDER TRIAGE NOTE - NSOBPROVIDERNOTE_OBGYN_ALL_OB_FT
A/P: 28 year old  with N/V, fever and abdominal cramping.   -IV fluids  -IV Zofran  -Continuous toco and fetal monitoring     Update: Patient reports that nausea resolved. Will discharge to home.     Discussed with Dr. Quintanilla A/P: 28 year old  with N/V, fever and abdominal cramping.   -IV fluids  -IV Zofran  -Continuous toco and fetal monitoring     Update: Patient reports that nausea resolved and patient is afebrile. Will discharge to home.     Discussed with Dr. Quintanilla A/P: 28 year old  with N/V, fever and abdominal cramping.   -IV fluids  -Recommend tylenol prn symptomatic relief  -Flu, COVID, strep swab negative at urgent care earlier today  -Continuous toco and fetal monitoring     Update: Patient reports that nausea resolved and patient is afebrile. Will discharge to home.     Discussed with Dr. Quintanilla

## 2024-03-08 NOTE — OB PROVIDER TRIAGE NOTE - HISTORY OF PRESENT ILLNESS
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 She also reports feeling cramping similar to period cramps in her lower abdominal region since yesterday that has worsened today.    Patient denies vaginal bleeding, contractions and leakage if fluid. She endorses good fetal movement.    KIRSTIN: 24    Prenatal course uncomplicated.     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 She also reports feeling cramping similar to period cramps in her lower abdominal region since yesterday that has worsened today.    Patient denies vaginal bleeding, contractions and leakage if fluid. She endorses good fetal movement.    KIRSTIN: 24    Prenatal course uncomplicated.     OBHx:  in 2016 at 40w for failure to progress, uncomplicated  GYNHx: -fibroids, -ovarian cysts, denies STD hx   PMH: IOANA  PSH: Denies  SH: Denies EtOH, tobacco and illicit drug use during this pregnancy, Feels safe at home   Allergies: NKDA  Meds: PNV, Iron, Vitamin C

## 2024-03-08 NOTE — OB PROVIDER TRIAGE NOTE - ATTENDING COMMENTS
Agree with above assessment and plan.  Pt is a 29yo Agree with above assessment and plan.  Pt is a 27 yo  at 26 wks who presented to n/v and had Tmax of 100.7 at home which resolved after taking tylenol.  Pt received zofran and IV hydration and felt better.  NST done baseline 150, +accel, no decels, appropriate for gestational age  [place]: no contractions.  Pt reassured.  Advised increase PO hydration and BRAT diet.  All questions answered.  Pt discharged and will f/u with Dr Hollis in office.    MERVAT Guzman

## 2024-03-08 NOTE — OB PROVIDER TRIAGE NOTE - NS_SPECEXAM_OBGYN_ALL_OB
Pt reportedly on hydrocodone and diazepam at home    Plan:  -Med rec in AM and restart at lower doses considering risk of withdrawal but also AMS No Pt possibly on hydrochlorothiazide, amlodipine and losartan (per outpatient med review). Doses unknown.    Plan:  -Med rec in AM and restart as necessary

## 2024-03-08 NOTE — OB PROVIDER TRIAGE NOTE - NSHPPHYSICALEXAM_GEN_ALL_CORE
T(C): 37.0 (24 @ 15:57), Max: 37.0 (24 @ 15:57)  HR: 115 (24 @ 15:57) (115 - 115)  BP: 91/63 (0824 @ 15:57) (91/63 - 91/63)  RR: 18 (24 @ 15:57) (18 - 18)    Gen: NAD  Pulm: Breathing comfortably on RA  Abd: Gravid  Tracinbpm, + accels, no decels, moderate variability  [place]: No contractions

## 2024-03-11 DIAGNOSIS — R50.9 FEVER, UNSPECIFIED: ICD-10-CM

## 2024-03-11 DIAGNOSIS — O34.219 MATERNAL CARE FOR UNSPECIFIED TYPE SCAR FROM PREVIOUS CESAREAN DELIVERY: ICD-10-CM

## 2024-03-11 DIAGNOSIS — R10.9 UNSPECIFIED ABDOMINAL PAIN: ICD-10-CM

## 2024-03-11 DIAGNOSIS — Z3A.26 26 WEEKS GESTATION OF PREGNANCY: ICD-10-CM

## 2024-03-11 DIAGNOSIS — R52 PAIN, UNSPECIFIED: ICD-10-CM

## 2024-03-11 DIAGNOSIS — O99.012 ANEMIA COMPLICATING PREGNANCY, SECOND TRIMESTER: ICD-10-CM

## 2024-03-11 DIAGNOSIS — O26.892 OTHER SPECIFIED PREGNANCY RELATED CONDITIONS, SECOND TRIMESTER: ICD-10-CM

## 2024-03-11 DIAGNOSIS — D50.9 IRON DEFICIENCY ANEMIA, UNSPECIFIED: ICD-10-CM

## 2024-03-11 DIAGNOSIS — O21.2 LATE VOMITING OF PREGNANCY: ICD-10-CM

## 2024-03-12 ENCOUNTER — NON-APPOINTMENT (OUTPATIENT)
Age: 29
End: 2024-03-12

## 2024-03-22 ENCOUNTER — APPOINTMENT (OUTPATIENT)
Dept: OBGYN | Facility: CLINIC | Age: 29
End: 2024-03-22
Payer: MEDICAID

## 2024-03-22 VITALS
BODY MASS INDEX: 25.16 KG/M2 | WEIGHT: 142 LBS | DIASTOLIC BLOOD PRESSURE: 58 MMHG | HEIGHT: 63 IN | SYSTOLIC BLOOD PRESSURE: 90 MMHG

## 2024-03-22 PROBLEM — D50.9 IRON DEFICIENCY ANEMIA, UNSPECIFIED: Chronic | Status: ACTIVE | Noted: 2024-03-08

## 2024-03-22 PROCEDURE — 0502F SUBSEQUENT PRENATAL CARE: CPT

## 2024-03-22 RX ORDER — FERROUS SULFATE 137(45) MG
142 (45 FE) TABLET, EXTENDED RELEASE ORAL
Qty: 90 | Refills: 0 | Status: ACTIVE | COMMUNITY
Start: 2024-03-22

## 2024-03-26 LAB
BASOPHILS # BLD AUTO: 0.04 K/UL
BASOPHILS NFR BLD AUTO: 0.4 %
EOSINOPHIL # BLD AUTO: 0.04 K/UL
EOSINOPHIL NFR BLD AUTO: 0.4 %
GLUCOSE 1H P 50 G GLC PO SERPL-MCNC: 149 MG/DL
HCT VFR BLD CALC: 30.2 %
HGB BLD-MCNC: 9.5 G/DL
IMM GRANULOCYTES NFR BLD AUTO: 0.7 %
LYMPHOCYTES # BLD AUTO: 1.15 K/UL
LYMPHOCYTES NFR BLD AUTO: 11.5 %
MAN DIFF?: NORMAL
MCHC RBC-ENTMCNC: 28.5 PG
MCHC RBC-ENTMCNC: 31.5 GM/DL
MCV RBC AUTO: 90.7 FL
MONOCYTES # BLD AUTO: 0.75 K/UL
MONOCYTES NFR BLD AUTO: 7.5 %
NEUTROPHILS # BLD AUTO: 7.98 K/UL
NEUTROPHILS NFR BLD AUTO: 79.5 %
PLATELET # BLD AUTO: 363 K/UL
RBC # BLD: 3.33 M/UL
RBC # FLD: 13.6 %
WBC # FLD AUTO: 10.03 K/UL

## 2024-04-01 LAB
FERRITIN SERPL-MCNC: 14 NG/ML
FOLATE SERPL-MCNC: >20 NG/ML
IRON SATN MFR SERPL: 8 %
IRON SERPL-MCNC: 47 UG/DL
TIBC SERPL-MCNC: 583 UG/DL
UIBC SERPL-MCNC: 535 UG/DL
VIT B12 SERPL-MCNC: 422 PG/ML

## 2024-04-10 ENCOUNTER — APPOINTMENT (OUTPATIENT)
Dept: OBGYN | Facility: CLINIC | Age: 29
End: 2024-04-10

## 2024-04-10 ENCOUNTER — APPOINTMENT (OUTPATIENT)
Dept: OBGYN | Facility: CLINIC | Age: 29
End: 2024-04-10
Payer: MEDICAID

## 2024-04-10 VITALS
DIASTOLIC BLOOD PRESSURE: 63 MMHG | SYSTOLIC BLOOD PRESSURE: 99 MMHG | WEIGHT: 146 LBS | HEIGHT: 63 IN | BODY MASS INDEX: 25.87 KG/M2

## 2024-04-10 PROCEDURE — 0502F SUBSEQUENT PRENATAL CARE: CPT

## 2024-04-17 ENCOUNTER — APPOINTMENT (OUTPATIENT)
Dept: ANTEPARTUM | Facility: CLINIC | Age: 29
End: 2024-04-17
Payer: MEDICAID

## 2024-04-17 ENCOUNTER — ASOB RESULT (OUTPATIENT)
Age: 29
End: 2024-04-17

## 2024-04-17 PROCEDURE — 76816 OB US FOLLOW-UP PER FETUS: CPT

## 2024-04-17 PROCEDURE — 76819 FETAL BIOPHYS PROFIL W/O NST: CPT

## 2024-04-17 PROCEDURE — 99202 OFFICE O/P NEW SF 15 MIN: CPT | Mod: 25

## 2024-04-23 ENCOUNTER — NON-APPOINTMENT (OUTPATIENT)
Age: 29
End: 2024-04-23

## 2024-04-24 ENCOUNTER — APPOINTMENT (OUTPATIENT)
Dept: OBGYN | Facility: CLINIC | Age: 29
End: 2024-04-24
Payer: MEDICAID

## 2024-04-24 VITALS
WEIGHT: 149 LBS | BODY MASS INDEX: 26.4 KG/M2 | HEIGHT: 63 IN | SYSTOLIC BLOOD PRESSURE: 101 MMHG | DIASTOLIC BLOOD PRESSURE: 50 MMHG

## 2024-04-24 LAB
GLUCOSE UR-MCNC: NEGATIVE
LEUKOCYTE ESTERASE UR QL STRIP: NORMAL
NITRITE UR QL STRIP: NORMAL
PROT UR STRIP-MCNC: NORMAL

## 2024-04-24 PROCEDURE — 0502F SUBSEQUENT PRENATAL CARE: CPT

## 2024-04-26 LAB — BACTERIA UR CULT: NORMAL

## 2024-04-29 ENCOUNTER — NON-APPOINTMENT (OUTPATIENT)
Age: 29
End: 2024-04-29

## 2024-05-06 ENCOUNTER — NON-APPOINTMENT (OUTPATIENT)
Age: 29
End: 2024-05-06

## 2024-05-08 ENCOUNTER — APPOINTMENT (OUTPATIENT)
Dept: OBGYN | Facility: CLINIC | Age: 29
End: 2024-05-08
Payer: MEDICAID

## 2024-05-08 VITALS
HEIGHT: 63 IN | BODY MASS INDEX: 28.35 KG/M2 | DIASTOLIC BLOOD PRESSURE: 74 MMHG | WEIGHT: 160 LBS | SYSTOLIC BLOOD PRESSURE: 105 MMHG

## 2024-05-08 VITALS
BODY MASS INDEX: 27.11 KG/M2 | WEIGHT: 153 LBS | DIASTOLIC BLOOD PRESSURE: 86 MMHG | SYSTOLIC BLOOD PRESSURE: 106 MMHG | HEIGHT: 63 IN

## 2024-05-08 PROCEDURE — 0502F SUBSEQUENT PRENATAL CARE: CPT

## 2024-05-13 ENCOUNTER — ASOB RESULT (OUTPATIENT)
Age: 29
End: 2024-05-13

## 2024-05-13 ENCOUNTER — APPOINTMENT (OUTPATIENT)
Dept: ANTEPARTUM | Facility: CLINIC | Age: 29
End: 2024-05-13
Payer: MEDICAID

## 2024-05-13 PROCEDURE — 76816 OB US FOLLOW-UP PER FETUS: CPT

## 2024-05-13 PROCEDURE — 76819 FETAL BIOPHYS PROFIL W/O NST: CPT

## 2024-05-21 ENCOUNTER — NON-APPOINTMENT (OUTPATIENT)
Age: 29
End: 2024-05-21

## 2024-05-22 ENCOUNTER — APPOINTMENT (OUTPATIENT)
Dept: OBGYN | Facility: CLINIC | Age: 29
End: 2024-05-22
Payer: MEDICAID

## 2024-05-22 ENCOUNTER — OUTPATIENT (OUTPATIENT)
Dept: INPATIENT UNIT | Facility: HOSPITAL | Age: 29
LOS: 1 days | End: 2024-05-22
Payer: MEDICAID

## 2024-05-22 ENCOUNTER — NON-APPOINTMENT (OUTPATIENT)
Age: 29
End: 2024-05-22

## 2024-05-22 VITALS — SYSTOLIC BLOOD PRESSURE: 102 MMHG | HEART RATE: 110 BPM | DIASTOLIC BLOOD PRESSURE: 68 MMHG

## 2024-05-22 DIAGNOSIS — O26.899 OTHER SPECIFIED PREGNANCY RELATED CONDITIONS, UNSPECIFIED TRIMESTER: ICD-10-CM

## 2024-05-22 PROCEDURE — 59025 FETAL NON-STRESS TEST: CPT

## 2024-05-22 PROCEDURE — 99221 1ST HOSP IP/OBS SF/LOW 40: CPT | Mod: 25,GC

## 2024-05-22 PROCEDURE — 0502F SUBSEQUENT PRENATAL CARE: CPT

## 2024-05-22 PROCEDURE — G0463: CPT

## 2024-05-22 PROCEDURE — 59025 FETAL NON-STRESS TEST: CPT | Mod: 26

## 2024-05-22 NOTE — OB RN TRIAGE NOTE - NS_FETALMOVEMENT_OBGYN_ALL_OB
"Lab results were returned. ""Return To Sender, No Mail Receptacle.  Unable To Forward,\""  " Present, unchanged

## 2024-05-22 NOTE — OB PROVIDER TRIAGE NOTE - NSHPPHYSICALEXAM_GEN_ALL_CORE
T(C): --  HR: 115 (05-22-24 @ 18:07) (115 - 116)  BP: 104/77 (05-22-24 @ 18:07) (104/77 - 117/76)  RR: 17 (05-22-24 @ 17:37) (17 - 17)  SpO2: --  Gen: NAD, well-appearing  Abd: soft, gravid  Ext: non-edematous, non-tender   SVE: 0/0/-3  FHT: 150 baseline, moderate variability, + accels, -decels  [place]: Irregular

## 2024-05-22 NOTE — OB PROVIDER TRIAGE NOTE - ATTENDING COMMENTS
Pt is a 28y  at 36w5d GA who presents to L&D from the office for rule out  labor  cervix closed on exam  NST done  baseline 155, +accel, no decel, moderate variability  [place]: irregular contractions.  Pt states she has not had anything PO fluids today.  Advised to hydrate.  She reports feeling occas menstrual like cramps, improved since arriving to L+D from Dr Hollis's office.  Pt reassured.  Labor precautions discussed.  Pt mildly tachy () not symptomatc; will PO hydrate now and repeat.  Will discharge home pending repeat set of VS.  Plan d/w pt and all questions answered    MERVAT Guzman

## 2024-05-22 NOTE — OB PROVIDER TRIAGE NOTE - NSOBPROVIDERNOTE_OBGYN_ALL_OB_FT
A/P:   28y  at 36w5d GA who presents to L&D from the office for rule out  labor  Fetus: Reactive  [place]: Irregular  SVE 0/0/-3  CTX likely from dehydration  PO hydration      Dispo: Discharge home with PTL precautions    Discussed with Dr. Quintanilla A/P:   28y  at 36w5d GA who presents to L&D from the office for rule out  labor  VSS  Fetus: Reactive  [place]: Irregular  SVE 0/0/-3  CTX likely from dehydration  Not in labor  PO hydration      Dispo: Discharge home with PTL precautions    Discussed with Dr. Quintanilla

## 2024-05-22 NOTE — OB PROVIDER TRIAGE NOTE - HISTORY OF PRESENT ILLNESS
CLYDE CARMEN is a 28y  at 36w5d GA who presents to L&D from the office for contractions on NST. Patient states that she feels contractions periodically. Patient denies incisional pain from prior . Patient states that she has not drank any water.    Pt denies vaginal bleeding and leakage of fluid. She endorses good fetal movement.   Pt denies trauma.  Pt denies headaches, visual disturbances, RUQ pain, epigastric pain and new-onset edema.   She denies any urinary complaints.   She denies fevers, chills, nausea, vomiting.   She denies shortness of breath, chest pain, and palpitations.  Pregnancy course is  uncomplicated.   Pregnancy course is significant for:    Prenatal course uncomplicated.     OBHx:  in 2016 at 40w for failure to progress, uncomplicated  GYNHx: -fibroids, -ovarian cysts, denies STD hx   PMH: IOANA  PSH: Denies  SH: Denies EtOH, tobacco and illicit drug use during this pregnancy, Feels safe at home   Allergies: NKDA  Meds: PNV, Iron, Vitamin C

## 2024-05-23 ENCOUNTER — NON-APPOINTMENT (OUTPATIENT)
Age: 29
End: 2024-05-23

## 2024-05-23 DIAGNOSIS — O99.013 ANEMIA COMPLICATING PREGNANCY, THIRD TRIMESTER: ICD-10-CM

## 2024-05-23 DIAGNOSIS — O34.219 MATERNAL CARE FOR UNSPECIFIED TYPE SCAR FROM PREVIOUS CESAREAN DELIVERY: ICD-10-CM

## 2024-05-23 DIAGNOSIS — O47.03 FALSE LABOR BEFORE 37 COMPLETED WEEKS OF GESTATION, THIRD TRIMESTER: ICD-10-CM

## 2024-05-23 DIAGNOSIS — Z3A.36 36 WEEKS GESTATION OF PREGNANCY: ICD-10-CM

## 2024-05-23 DIAGNOSIS — D50.9 IRON DEFICIENCY ANEMIA, UNSPECIFIED: ICD-10-CM

## 2024-05-24 ENCOUNTER — NON-APPOINTMENT (OUTPATIENT)
Age: 29
End: 2024-05-24

## 2024-05-28 LAB
BACTERIA UR CULT: NORMAL
CLARITY UR: CLEAR
COLLECTION METHOD: NORMAL
GLUCOSE UR-MCNC: NEGATIVE
GP B STREP DNA SPEC QL NAA+PROBE: NOT DETECTED
HHV SPEC CULT: NORMAL
HIV1+2 AB SPEC QL IA.RAPID: NONREACTIVE
HSV TYPE 1: NORMAL
HSV TYPE 2: NORMAL
LEUKOCYTE ESTERASE UR QL STRIP: NORMAL
NITRITE UR QL STRIP: NEGATIVE
PROT UR STRIP-MCNC: 30
SOURCE GBS: NORMAL
T PALLIDUM AB SER QL IA: NEGATIVE

## 2024-05-29 ENCOUNTER — NON-APPOINTMENT (OUTPATIENT)
Age: 29
End: 2024-05-29

## 2024-05-29 ENCOUNTER — APPOINTMENT (OUTPATIENT)
Dept: OBGYN | Facility: CLINIC | Age: 29
End: 2024-05-29
Payer: MEDICAID

## 2024-05-29 VITALS
DIASTOLIC BLOOD PRESSURE: 70 MMHG | BODY MASS INDEX: 27.46 KG/M2 | SYSTOLIC BLOOD PRESSURE: 110 MMHG | HEIGHT: 63 IN | WEIGHT: 155 LBS

## 2024-05-29 PROCEDURE — 0502F SUBSEQUENT PRENATAL CARE: CPT

## 2024-06-05 ENCOUNTER — APPOINTMENT (OUTPATIENT)
Dept: OBGYN | Facility: CLINIC | Age: 29
End: 2024-06-05
Payer: MEDICAID

## 2024-06-05 VITALS
SYSTOLIC BLOOD PRESSURE: 118 MMHG | DIASTOLIC BLOOD PRESSURE: 75 MMHG | BODY MASS INDEX: 27.82 KG/M2 | WEIGHT: 157 LBS | HEIGHT: 63 IN

## 2024-06-05 PROCEDURE — 0502F SUBSEQUENT PRENATAL CARE: CPT

## 2024-06-09 ENCOUNTER — INPATIENT (INPATIENT)
Facility: HOSPITAL | Age: 29
LOS: 2 days | Discharge: HOME | DRG: 833 | End: 2024-06-12
Attending: OBSTETRICS & GYNECOLOGY | Admitting: OBSTETRICS & GYNECOLOGY
Payer: MEDICAID

## 2024-06-09 VITALS
SYSTOLIC BLOOD PRESSURE: 114 MMHG | RESPIRATION RATE: 20 BRPM | DIASTOLIC BLOOD PRESSURE: 70 MMHG | TEMPERATURE: 98 F | HEART RATE: 113 BPM

## 2024-06-09 DIAGNOSIS — O26.899 OTHER SPECIFIED PREGNANCY RELATED CONDITIONS, UNSPECIFIED TRIMESTER: ICD-10-CM

## 2024-06-10 ENCOUNTER — RESULT REVIEW (OUTPATIENT)
Age: 29
End: 2024-06-10

## 2024-06-10 ENCOUNTER — APPOINTMENT (OUTPATIENT)
Dept: OBGYN | Facility: CLINIC | Age: 29
End: 2024-06-10

## 2024-06-10 ENCOUNTER — TRANSCRIPTION ENCOUNTER (OUTPATIENT)
Age: 29
End: 2024-06-10

## 2024-06-10 DIAGNOSIS — O26.893 OTHER SPECIFIED PREGNANCY RELATED CONDITIONS, THIRD TRIMESTER: ICD-10-CM

## 2024-06-10 DIAGNOSIS — Z98.891 HISTORY OF UTERINE SCAR FROM PREVIOUS SURGERY: Chronic | ICD-10-CM

## 2024-06-10 LAB
BASOPHILS # BLD AUTO: 0.02 K/UL — SIGNIFICANT CHANGE UP (ref 0–0.2)
BASOPHILS # BLD AUTO: 0.03 K/UL — SIGNIFICANT CHANGE UP (ref 0–0.2)
BASOPHILS NFR BLD AUTO: 0.1 % — SIGNIFICANT CHANGE UP (ref 0–2)
BASOPHILS NFR BLD AUTO: 0.2 % — SIGNIFICANT CHANGE UP (ref 0–2)
BLD GP AB SCN SERPL QL: SIGNIFICANT CHANGE UP
EOSINOPHIL # BLD AUTO: 0 K/UL — SIGNIFICANT CHANGE UP (ref 0–0.5)
EOSINOPHIL # BLD AUTO: 0.08 K/UL — SIGNIFICANT CHANGE UP (ref 0–0.5)
EOSINOPHIL NFR BLD AUTO: 0 % — SIGNIFICANT CHANGE UP (ref 0–6)
EOSINOPHIL NFR BLD AUTO: 0.7 % — SIGNIFICANT CHANGE UP (ref 0–6)
HCT VFR BLD CALC: 22.7 % — LOW (ref 34.5–45)
HCT VFR BLD CALC: 28.6 % — LOW (ref 34.5–45)
HGB BLD-MCNC: 7.2 G/DL — LOW (ref 11.5–15.5)
HGB BLD-MCNC: 9.2 G/DL — LOW (ref 11.5–15.5)
IMM GRANULOCYTES NFR BLD AUTO: 0.4 % — SIGNIFICANT CHANGE UP (ref 0–0.9)
IMM GRANULOCYTES NFR BLD AUTO: 0.7 % — SIGNIFICANT CHANGE UP (ref 0–0.9)
LYMPHOCYTES # BLD AUTO: 1.35 K/UL — SIGNIFICANT CHANGE UP (ref 1–3.3)
LYMPHOCYTES # BLD AUTO: 2.45 K/UL — SIGNIFICANT CHANGE UP (ref 1–3.3)
LYMPHOCYTES # BLD AUTO: 21.8 % — SIGNIFICANT CHANGE UP (ref 13–44)
LYMPHOCYTES # BLD AUTO: 6.4 % — LOW (ref 13–44)
MCHC RBC-ENTMCNC: 26.2 PG — LOW (ref 27–34)
MCHC RBC-ENTMCNC: 26.3 PG — LOW (ref 27–34)
MCHC RBC-ENTMCNC: 31.7 GM/DL — LOW (ref 32–36)
MCHC RBC-ENTMCNC: 32.2 GM/DL — SIGNIFICANT CHANGE UP (ref 32–36)
MCV RBC AUTO: 81.5 FL — SIGNIFICANT CHANGE UP (ref 80–100)
MCV RBC AUTO: 82.8 FL — SIGNIFICANT CHANGE UP (ref 80–100)
MONOCYTES # BLD AUTO: 0.98 K/UL — HIGH (ref 0–0.9)
MONOCYTES # BLD AUTO: 1.42 K/UL — HIGH (ref 0–0.9)
MONOCYTES NFR BLD AUTO: 6.8 % — SIGNIFICANT CHANGE UP (ref 2–14)
MONOCYTES NFR BLD AUTO: 8.7 % — SIGNIFICANT CHANGE UP (ref 2–14)
NEUTROPHILS # BLD AUTO: 18.06 K/UL — HIGH (ref 1.8–7.4)
NEUTROPHILS # BLD AUTO: 7.67 K/UL — HIGH (ref 1.8–7.4)
NEUTROPHILS NFR BLD AUTO: 68.2 % — SIGNIFICANT CHANGE UP (ref 43–77)
NEUTROPHILS NFR BLD AUTO: 86 % — HIGH (ref 43–77)
PLATELET # BLD AUTO: 309 K/UL — SIGNIFICANT CHANGE UP (ref 150–400)
PLATELET # BLD AUTO: 385 K/UL — SIGNIFICANT CHANGE UP (ref 150–400)
RBC # BLD: 2.74 M/UL — LOW (ref 3.8–5.2)
RBC # BLD: 3.51 M/UL — LOW (ref 3.8–5.2)
RBC # FLD: 14 % — SIGNIFICANT CHANGE UP (ref 10.3–14.5)
RBC # FLD: 14 % — SIGNIFICANT CHANGE UP (ref 10.3–14.5)
WBC # BLD: 11.25 K/UL — HIGH (ref 3.8–10.5)
WBC # BLD: 21 K/UL — HIGH (ref 3.8–10.5)
WBC # FLD AUTO: 11.25 K/UL — HIGH (ref 3.8–10.5)
WBC # FLD AUTO: 21 K/UL — HIGH (ref 3.8–10.5)

## 2024-06-10 PROCEDURE — 88307 TISSUE EXAM BY PATHOLOGIST: CPT | Mod: 26

## 2024-06-10 PROCEDURE — 93010 ELECTROCARDIOGRAM REPORT: CPT

## 2024-06-10 RX ORDER — DIPHENHYDRAMINE HCL 50 MG
25 CAPSULE ORAL EVERY 6 HOURS
Refills: 0 | Status: DISCONTINUED | OUTPATIENT
Start: 2024-06-10 | End: 2024-06-12

## 2024-06-10 RX ORDER — KETOROLAC TROMETHAMINE 30 MG/ML
30 SYRINGE (ML) INJECTION ONCE
Refills: 0 | Status: DISCONTINUED | OUTPATIENT
Start: 2024-06-10 | End: 2024-06-10

## 2024-06-10 RX ORDER — CITRIC ACID/SODIUM CITRATE 300-500 MG
30 SOLUTION, ORAL ORAL ONCE
Refills: 0 | Status: COMPLETED | OUTPATIENT
Start: 2024-06-10 | End: 2024-06-10

## 2024-06-10 RX ORDER — HYDROCORTISONE 1 %
1 OINTMENT (GRAM) TOPICAL EVERY 6 HOURS
Refills: 0 | Status: DISCONTINUED | OUTPATIENT
Start: 2024-06-10 | End: 2024-06-12

## 2024-06-10 RX ORDER — PRAMOXINE HYDROCHLORIDE 150 MG/15G
1 AEROSOL, FOAM RECTAL EVERY 4 HOURS
Refills: 0 | Status: DISCONTINUED | OUTPATIENT
Start: 2024-06-10 | End: 2024-06-12

## 2024-06-10 RX ORDER — SODIUM CHLORIDE 9 MG/ML
1000 INJECTION, SOLUTION INTRAVENOUS
Refills: 0 | Status: DISCONTINUED | OUTPATIENT
Start: 2024-06-10 | End: 2024-06-10

## 2024-06-10 RX ORDER — IBUPROFEN 200 MG
600 TABLET ORAL EVERY 6 HOURS
Refills: 0 | Status: COMPLETED | OUTPATIENT
Start: 2024-06-10 | End: 2025-05-09

## 2024-06-10 RX ORDER — SIMETHICONE 80 MG/1
80 TABLET, CHEWABLE ORAL EVERY 4 HOURS
Refills: 0 | Status: DISCONTINUED | OUTPATIENT
Start: 2024-06-10 | End: 2024-06-12

## 2024-06-10 RX ORDER — ACETAMINOPHEN 500 MG
975 TABLET ORAL
Refills: 0 | Status: DISCONTINUED | OUTPATIENT
Start: 2024-06-10 | End: 2024-06-10

## 2024-06-10 RX ORDER — ACETAMINOPHEN 500 MG
975 TABLET ORAL EVERY 6 HOURS
Refills: 0 | Status: DISCONTINUED | OUTPATIENT
Start: 2024-06-10 | End: 2024-06-12

## 2024-06-10 RX ORDER — IRON SUCROSE 20 MG/ML
200 INJECTION, SOLUTION INTRAVENOUS ONCE
Refills: 0 | Status: COMPLETED | OUTPATIENT
Start: 2024-06-10 | End: 2024-06-10

## 2024-06-10 RX ORDER — AER TRAVELER 0.5 G/1
1 SOLUTION RECTAL; TOPICAL EVERY 4 HOURS
Refills: 0 | Status: DISCONTINUED | OUTPATIENT
Start: 2024-06-10 | End: 2024-06-12

## 2024-06-10 RX ORDER — POLYETHYLENE GLYCOL 3350 17 G/17G
17 POWDER, FOR SOLUTION ORAL DAILY
Refills: 0 | Status: DISCONTINUED | OUTPATIENT
Start: 2024-06-10 | End: 2024-06-12

## 2024-06-10 RX ORDER — OXYCODONE HYDROCHLORIDE 5 MG/1
5 TABLET ORAL
Refills: 0 | Status: DISCONTINUED | OUTPATIENT
Start: 2024-06-10 | End: 2024-06-10

## 2024-06-10 RX ORDER — DIBUCAINE 1 %
1 OINTMENT (GRAM) RECTAL EVERY 6 HOURS
Refills: 0 | Status: DISCONTINUED | OUTPATIENT
Start: 2024-06-10 | End: 2024-06-12

## 2024-06-10 RX ORDER — IBUPROFEN 200 MG
600 TABLET ORAL EVERY 6 HOURS
Refills: 0 | Status: DISCONTINUED | OUTPATIENT
Start: 2024-06-10 | End: 2024-06-12

## 2024-06-10 RX ORDER — BENZOCAINE 10 %
1 GEL (GRAM) MUCOUS MEMBRANE EVERY 6 HOURS
Refills: 0 | Status: DISCONTINUED | OUTPATIENT
Start: 2024-06-10 | End: 2024-06-12

## 2024-06-10 RX ORDER — GENTAMICIN SULFATE 40 MG/ML
380 VIAL (ML) INJECTION ONCE
Refills: 0 | Status: DISCONTINUED | OUTPATIENT
Start: 2024-06-10 | End: 2024-06-10

## 2024-06-10 RX ORDER — OXYTOCIN 10 UNIT/ML
333.33 VIAL (ML) INJECTION
Qty: 20 | Refills: 0 | Status: COMPLETED | OUTPATIENT
Start: 2024-06-10 | End: 2024-06-10

## 2024-06-10 RX ORDER — TETANUS TOXOID, REDUCED DIPHTHERIA TOXOID AND ACELLULAR PERTUSSIS VACCINE, ADSORBED 5; 2.5; 8; 8; 2.5 [IU]/.5ML; [IU]/.5ML; UG/.5ML; UG/.5ML; UG/.5ML
0.5 SUSPENSION INTRAMUSCULAR ONCE
Refills: 0 | Status: DISCONTINUED | OUTPATIENT
Start: 2024-06-10 | End: 2024-06-12

## 2024-06-10 RX ORDER — OXYCODONE HYDROCHLORIDE 5 MG/1
5 TABLET ORAL ONCE
Refills: 0 | Status: DISCONTINUED | OUTPATIENT
Start: 2024-06-10 | End: 2024-06-10

## 2024-06-10 RX ORDER — CHLORHEXIDINE GLUCONATE 213 G/1000ML
1 SOLUTION TOPICAL DAILY
Refills: 0 | Status: DISCONTINUED | OUTPATIENT
Start: 2024-06-10 | End: 2024-06-10

## 2024-06-10 RX ORDER — GENTAMICIN SULFATE 40 MG/ML
300 VIAL (ML) INJECTION ONCE
Refills: 0 | Status: COMPLETED | OUTPATIENT
Start: 2024-06-10 | End: 2024-06-10

## 2024-06-10 RX ORDER — MAGNESIUM HYDROXIDE 400 MG/1
30 TABLET, CHEWABLE ORAL
Refills: 0 | Status: DISCONTINUED | OUTPATIENT
Start: 2024-06-10 | End: 2024-06-12

## 2024-06-10 RX ORDER — OXYTOCIN 10 UNIT/ML
41.67 VIAL (ML) INJECTION
Qty: 20 | Refills: 0 | Status: DISCONTINUED | OUTPATIENT
Start: 2024-06-10 | End: 2024-06-12

## 2024-06-10 RX ORDER — SODIUM CHLORIDE 9 MG/ML
3 INJECTION INTRAMUSCULAR; INTRAVENOUS; SUBCUTANEOUS EVERY 8 HOURS
Refills: 0 | Status: DISCONTINUED | OUTPATIENT
Start: 2024-06-10 | End: 2024-06-12

## 2024-06-10 RX ORDER — LANOLIN
1 OINTMENT (GRAM) TOPICAL EVERY 6 HOURS
Refills: 0 | Status: DISCONTINUED | OUTPATIENT
Start: 2024-06-10 | End: 2024-06-12

## 2024-06-10 RX ORDER — SENNA PLUS 8.6 MG/1
2 TABLET ORAL AT BEDTIME
Refills: 0 | Status: DISCONTINUED | OUTPATIENT
Start: 2024-06-10 | End: 2024-06-12

## 2024-06-10 RX ADMIN — Medication 30 MILLIGRAM(S): at 16:27

## 2024-06-10 RX ADMIN — IRON SUCROSE 110 MILLIGRAM(S): 20 INJECTION, SOLUTION INTRAVENOUS at 21:33

## 2024-06-10 RX ADMIN — SODIUM CHLORIDE 125 MILLILITER(S): 9 INJECTION, SOLUTION INTRAVENOUS at 01:00

## 2024-06-10 RX ADMIN — Medication 975 MILLIGRAM(S): at 20:21

## 2024-06-10 RX ADMIN — Medication 200 MILLIGRAM(S): at 16:48

## 2024-06-10 RX ADMIN — SODIUM CHLORIDE 125 MILLILITER(S): 9 INJECTION, SOLUTION INTRAVENOUS at 02:09

## 2024-06-10 RX ADMIN — Medication 30 MILLILITER(S): at 06:39

## 2024-06-10 RX ADMIN — Medication 30 MILLIGRAM(S): at 17:30

## 2024-06-10 RX ADMIN — Medication 1000 MILLIUNIT(S)/MIN: at 16:28

## 2024-06-10 RX ADMIN — SODIUM CHLORIDE 3 MILLILITER(S): 9 INJECTION INTRAMUSCULAR; INTRAVENOUS; SUBCUTANEOUS at 21:45

## 2024-06-10 RX ADMIN — Medication 100 MILLIGRAM(S): at 20:19

## 2024-06-10 NOTE — OB RN DELIVERY SUMMARY - BABY A SEX
Patient: FREDERICK KNOWLES JR     Acct: [id number]     Report: #[id number]  UNIT #: [id number]     : 1965    Encounter Date:2018  PRIMARY CARE: NAVEED ELIZABETH  ***Signed***  --------------------------------------------------------------------------------------------------------------------  Chief Complaint      Encounter Date      2018            Referring Provider      Tenisha Clayton            Patient Complaint      Patient is complaining of      results follow up            VITALS      Height 5 ft 8 in / 172.72 cm      Weight 252 lbs 7 oz / 114.741142 kg      BSA 2.39 m2      BMI 38.4 kg/m2      Temperature 98.0 F / 36.67 C - Oral      Pulse 76      Respirations 16      Blood Pressure 132/76 Sitting, Right Arm      Pulse Oximetry 95%, room air      Exhaled Nitrous Oxide Testin            HPI      The patient is a 52 year old male with a history of smoking in the past and     persistent cough with nodular opacities bilaterally concerning for chronic     indolent infection. There was also concern for malignancy or other inflammatory     process.  He is here for follow up after repeat CT scan of the chest.  CT scan     done on 2018 compared to the past still shows persistent several nodular     pleural based opacities throughout the lung.  He has no fever, chills, weight     loss or loss of appetite. CT scan showed persistent findings and he continues     to have symptoms with cough and some shortness of breath with activities.  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  Currently he works in a factory.            ROS      Constitutional:  Complains of: Fatigue, Denies: Fever, Weight gain, Weight loss    , Chills, Insomnia, Other      Respiratory/Breathing:  Complains of: Shortness of air, Cough, Denies: Wheezing    , Hemoptysis, Pleuritic pain, Other      Endocrine:  Denies: Polydipsia, Polyuria, Heat/cold intolerance,  Diabetes, Other      Eyes:  Denies: Blurred vision, Vision Changes, Other      Ears, nose, mouth, throat:  Denies: Mouth lesions, Thrush, Throat pain,     Hoarseness, Allergies/Hay Fever, Post Nasal Drip, Headaches, Recent Head Injury    , Nose Bleeding, Neck Stiffness, Thyroid Mass, Hearing Loss, Ear Fullness, Dry     Mouth, Nasal or Sinus Pain, Dry Lips, Nasal discharge, Nasal congestion, Other      Cardiovascular:  Denies: Palpitations, Syncope, Claudication, Chest Pain, Wake     up Gasping for air, Leg Swelling, Irregular Heart Rate, Cyanosis, Dyspnea on     Exertion, Other      Gastrointestinal:  Denies: Nausea, Constipation, Diarrhea, Abdominal pain,     Vomiting, Difficulty Swallowing, Reflux/Heartburn, Dysphagia, Jaundice, Bloating    , Melena, Bloody stools, Other      Genitourinary:  Denies: Urinary frequency, Incontinence, Hematuria, Urgency,     Nocturia, Dysuria, Testicular problems, Other      Musculoskeletal:  Denies: Joint Pain, Joint Stiffness, Joint Swelling, Myalgias    , Other      Hematologic/lymphatic:  DENIES: Lymphadenopathy, Bruising, Bleeding tendencies,     Other      Neurological:  Denies: Headache, Numbness, Weakness, Seizures, Other      Psychiatric:  Denies: Anxiety, Appropriate Effect, Depression, Other      Sleep:  No: Excessive daytime sleep, Morning Headache?, Snoring, Insomnia?,     Stop breathing at sleep?, Other      Integumentary:  Denies: Rash, Dry skin, Skin Warm to Touch, Other      Immunologic/Allergic:  Denies: Latex allergy, Seasonal allergies, Asthma,     Urticaria, Eczema, Other      Immunization status:  No: Up to date            FAMILY/SOCIAL/MEDICAL HX      Surgical History:  Yes: Appendectomy (AGE 12YR), Kidney Surgery (Kidney stones)    , No: AAA Repair, Abdominal Surgery, Angioplasty, Back Surgery, Bladder Surgery    , Bowel Surgery, Breast Surgery, CABG, Carotid Stenosis, Cholecystectomy, Ear     Surgery, Eye Surgery, Head Surgery, Hernia Surgery, Nose Surgery,  "Oral Surgery,     Orthopedic Surgery, Prostatectomy, Rectal Surgery, Spinal Surgery, Testicular     Surgery, Throat Surgery, Valve Replacement, Vascular Surgery, Other Surgeries      Stroke - Family Hx:  Mother      Diabetes - Family Hx:  Brother      Cancer/Type - Family Hx:  Father      Other Family Medical History:  Mother      Is Father Still Living?:  No      Is Mother Still Living?:  Yes      Social History:  No Tobacco Use, No Alcohol Use, No Recreational Drug use      Smoking status:  Former smoker (1 ppd x 25 years, quit 2015)      Anticoagulation Therapy:  No      Antibiotic Prophylaxis:  No      Medical History:  Yes: Arthritis, Blood Disease (\"POLYMYACITIS.\"), High Blood     Pressure, Kidney Stones (\"19 YEARS AGO, AND PASSED ANOTHER ONE HERE IN E.D. 2-3     YRS AGO.\"), Reflux Disease (ON MEDICATION), No: Alcoholism, Allergies, Anemia,     Asthma, Broken Bones, Cataracts, Chemical Dependency, Chemotherapy/Cancer,     Chronic Bronchitis/COPD, Emphysema, Chronic Liver Disease, Colon Trouble,     Colitis, Diverticulitis, Congestive Heart Failu, Deafness or Ringing Ears,     Convulsions, Depression, Anxiety, Bipolar Disorder, Diabetes, Epilepsy, Seizures    , Forgetfullness, Glaucoma, Gall Stones, Gout, Head Injury, Heart Attack, Heart     Murmur, Hemorrhoids/Rectal Prob, Hepatitis, Hiatal Hernia, High Cholesterol,     HIV (Do not ask - volu, Jaundice, Kidney or Bladder Disease, Migrane Headaches,     Mitral Valve Prolapse, Night sweats, Phlebitis, Psychiatric Care, Rheumatic     Fever, Sexually Transmitted Dis, Shortness Of Breath, Sinus Trouble, Skin     Disease/Psoriais/Ecz, Stroke, Thyroid Problem, Tuberculosis or Pos TB Te,     Miscellaneous Medical/oth            Hx Influenza Vaccination:  No      Influenza Vaccine Declined:  Yes      2 or More Falls Past Year?:  No      Fall Past Year with Injury?:  No      Hx Pneumococcal Vaccination:  No      Encouraged to follow-up with:  PCP regarding preventative " exams.      Chart initiated by      dean pan ma            ALLERGIES/MEDICATIONS      Allergies:        Coded Allergies:             SULFA (SULFONAMIDE ANTIBIOTICS) (Verified  Allergy, Unknown, 2/9/18)           VALDECOXIB (Verified  Allergy, Unknown, 2/9/18)      Uncoded Allergies:             Sulfa (Allergy, Unknown, 4/25/06)           VALDECOXIB (Allergy, Unknown, 4/25/06)      Medications    Last Reconciled on 2/9/18 13:12 by MICHAEL MARTINEZ MD      Naproxen (Naprosyn EC) 375 Mg Tablet.dr      375 MG PO QDAY, TAB         Reported         1/9/18       Hydroxychloroquine Sulfate (Plaquenil*) 200 Mg Tab      200 MG PO QDAY, TAB         Reported         1/9/18       Lisinopril* (Lisinopril*) 20 Mg Tablet      20 MG PO HS, #30 TAB 0 Refills         Reported         1/9/18       predniSONE* (predniSONE*) 2.5 Mg Tablet      2.5 MG PO QDAY, TAB         Reported         1/9/18      Current Medications      Current Medications Reviewed 2/9/18            EXAM      CONSTITUTIONAL: Pleasant male in no acute distress, normal conversant.       EYES : Pink conjunctive, no ptosis, PERRL.       ENMT : Nose and ears appear normal, normal dentition, mild posterior pharyngeal     wall erythema. Mallampati classification II, no sinus tenderness.       Neck: Nontender, no masses, no thyromegaly, no nodules.      Resp : Bilateral diminished breath sounds, resonant to percussion bilaterally,     scattered expiratory wheezing, no crackles or rhonchi.      CVS  : No carotid bruits, s1s2 nl, RRR, no murmur, rubs or gallop, no     peripheral edema       Chest wall: Normal rise with inspiration, nontender on palpation      GI   : Abdomen soft, with no masses, no hepatosplenomegaly, no hernias, BS+      MSK  : Normal gait and station, no digital cyanosis or clubbing       Skin : No rashes, ulcerations or lesions, normal turgor and temperature      Neuro: CN II - XII intact, no sensory deficits, DTRs intact and symmetrical, no     motor  weakness      Psych: Appropriate affect, A   Vtials      Vitals:             Height 5 ft 8 in / 172.72 cm           Weight 252 lbs 7 oz / 114.587312 kg           BSA 2.39 m2           BMI 38.4 kg/m2           Temperature 98.0 F / 36.67 C - Oral           Pulse 76           Respirations 16           Blood Pressure 132/76 Sitting, Right Arm           Pulse Oximetry 95%, room air            REVIEW      Results Reviewed      PCCS Results Reviewed?:  Yes Prev Lab Results, Yes Prev Radiology Results, Yes     Previous [facility] Records      Lab Results      The patient's previous blood work from 2016 showed a normal CBC with     normal CMP.  He does not use any blood thinners.      Radiographic Results               [facility]                PACS RADIOLOGY REPORT            Patient: FREDERICK KNOWLES JR   Acct: #[id number]   Report: #[id number]            UNIT #: [id number]    DOS: 18 0940      INSURANCE:[employer]WORK - [facility]   ORDER #:[id number]      LOCATION:CT     : 1965            PROVIDERS      ADMITTING:     ATTENDING: Louis Geronimo      FAMILY:  NAVEED ELIZABETH   ORDERING:  Louis Geronimo         OTHER:    DICTATING:  Evin August MD, JR            REQ #:[id number]   [facility]      REASON FOR EXAM:  R05      REASON FOR VISIT:  R05            *******Signed******         PROCEDURE:   CT CHEST WITHOUT CONTRAST             COMPARISONS:   [facility], CT, CHEST W CONTRAST R/O PE, 3/28/2008    , 14:10.  [facility], CR, CHEST PA/AP      Other, [place], [place], CT, CHEST W/O CONTRAST, , 13:38.             INDICATIONS:   R05/ COUGH; OTHER NONSPECIFIC ABNORMAL FINDINGS OF LUNG FIELD.             TECHNIQUE:   Multiplanar CT images were created without the administration of     contrast material.               PROTOCOL:     Standard imaging protocol  performed                RADIATION:     DLP: 473mGy*cm          Automated exposure control was utilized to minimize radiation dose.              FINDINGS:   There is a subpleural nodule in the inferior, posterior, lateral     right middle lobe, which       measures 1.9 x 1.8 cm on image 40 of series 3. Previously, it measured 1.9 x     1.4 cm on image 38 of       series 4 from the outside study, dated 12/30/2016. It is noncalcified and is non    -cavitary. It       measures approximately 2.3 cm in craniocaudal extent on the current study.     Previously, it measured       approximately 2.4 cm in craniocaudal extent. The finding is thought not to be     significantly       changed. It may represent pleural-parenchymal scarring. Round atelectasis is     possible. A benign or       malignant nodule of pleural origin cannot be excluded; the finding is new since     the prior study       from 03/28/2008. A post infectious/inflammatory nodule is also possible. There     are several other       similar but smaller findings, especially on the right. The largest pleural-    based nodule (or       pleuroparenchymal density) on the left is probably seen in the medial, inferior    , posterior left       thorax, as seen on image 48 of series 2. It measures approximately 3.1 cm in     greatest axial extent       on the current study. Previously, it measured 3.2 cm in greatest axial extent.     On coronal       reformations, the finding is estimated at about 3.6 cm in greatest craniocaudal     extent currently.       Previously, it measured about 4.9 cm. Again, it may represent a pleural-based     nodule, such as       related to prior asbestos exposure and/or a benign or malignant tumor. Round     atelectasis is       possible. Pleural-parenchymal fibrotic change would be in the differential     diagnosis. This finding       is thought not to be significantly changed since the prior exam from 12/30/2016    , as well. Please        correlate clinically. For instance, does the patient have a history of asbestos     exposure?       Pleural-based metastatic disease or mesothelioma are possible but thought to be     less likely,       considering the lack of significant interval change since the prior outside     chest CT study from       12/03/2016.              No definite acute infiltrate is seen. There is mild subsegmental atelectasis and    /or fibrosis in the       lung bases. There is borderline cardiac enlargement. There is mild thickening     of the pericardium       with a maximum thickness estimated at 8 mm, near the left anterior cardiac     margin, as seen on image       40 of series 2. This finding is stable since the prior study. There are several     small mediastinal       lymph nodes. There may be residual thymic tissue. These findings were seen     previously. No       aneurysmal enlargement of the thoracic aorta suspected. Coronary artery     calcifications are seen.       There is a tiny hiatal hernia. There is thought to be a stable low attenuation     structure in the       posterior upper pole of the right kidney, measuring 2.3 cm, with a CT number     near 0 to -1       Hounsfield units. It probably represents a benign renal cyst. No right adrenal     mass is seen. There       is a stable left adrenal mass, measuring approximately 2.1 cm, with a CT number     of approximately       -10 Hounsfield units. It probably represents a benign adenoma. Consider imaging     follow-up, such as       with a Nuclear Medicine (NM) whole-body PET/CT scan, especially to further     evaluate the       pleural-based nodules (if not already performed). No pneumothorax is seen.             CONCLUSION:                1. Probably no acute findings are present. No acute infiltrate is suggested.              2. No pleural effusion is suggested.              3. There may be borderline cardiac enlargement.             4. Probably no significant  interval change is seen since the prior outside CT     study from       12/30/2016, allowing for differences in CT scanners, slice thickness, and scan     angle. Again       demonstrated are several nodular pleural-based opacities throughout the lungs,     probably greater in       size and number on the right. These findings are nonspecific. They may be     related to prior asbestos       exposure. Consider a Nuclear Medicine (NM) whole-body PET-CT study for further     imaging assessment       if clinically warranted and if not already performed.              5. Please see above comments for further detail.             ALDEN NORTON JR, MD             Electronically Signed and Approved By: ALDEN NORTON JR, MD on 1/26/2018 at 3:    26                        Until signed, this is an unconfirmed preliminary report that may contain      errors and is subject to change.                                              LUPIS:      D:01/26/18 0116            PLAN      Assessment      Notes      New Diagnostics      * PET Skull Base Midthigh Init, 1 DAY       Dx: Solitary pulmonary nodule - R91.1      PLAN:      The patient is a 53 year old male with a history of smoking in the past,     persistent cough with nodular opacities bilaterally concerning for chronic     indolent infection versus malignancy.              1. CT scan abnormality.  CT scan abnormality is concerning for possible chronic     indolent infectious/inflammatory process or some opacities may be concerning     for malignancy.  I discussed with him regarding PET scan and bronchoscopy.  He     is willing to have PET scan done.  I have ordered PET scan.  Pulmonary function     test and six minute walk test are already ordered.  I discussed with him     regarding bronchoscopy, he is agreeable. We will schedule bronchoscopy for next     week.            2. I will follow up with him in 2-3 weeks after bronchoscopy.            Patient Education      Education  resources provided:  Yes      Patient Education Provided:  Acute Bronchitis, Bronchoscopy            Procedure Orders      Get Consent signed for:        Bronchoscopy with bronchoalveolar lavage, transbronchial biopsies,      bronchial brushings, bronchial washings, bronchoalveolar lavage and airway      inspection.      Risks and Benefits:        The patient understands the risks including pneumothorax, pneumonia,      bleeding,      respiratory depression and it could be fatal as well. The patient      understands and is agreeable for the procedure.  We will schedule him for      bronchoscopy for next week.                 Disclaimer: Converted document may not contain table formatting or lab diagrams. Please see [employer] for the authenticated document.   Female

## 2024-06-10 NOTE — OB NEONATOLOGY/PEDIATRICIAN DELIVERY SUMMARY - NSPEDSNEONOTESA_OBGYN_ALL_OB_FT
Baby Girl Althea born at 39.3 via  () to a 32yo  B+, Hep B neg, HIV NR, RPR NR, Rubella Imm, GBS negative mother. Maternal hx of anemia. No prenatal complications. Labor c/w NRFHT. AROM mec stained amniotic fluid 7hours prior to delivery. Highest maternal temp 37.1. Tight nuchal x1 noted at delivery. Baby emerged vigorous and crying. Warmed, dried, suctioned, stimulated. Apgar 9/9. Admit to  nursery. EOS 0.14

## 2024-06-10 NOTE — OB PROVIDER H&P - ASSESSMENT
29y  @39.6w GA admitted for TOLAC.    - admission labs  - GBS ppx as indicated  - IV fluids  - cat 1 fht, continue to monitor  - regular, frequent contractions, continue with expectant management  - epi prn    discussed with Dr. Mariano Hollis aware

## 2024-06-10 NOTE — OB PROVIDER DELIVERY SUMMARY - NSPROVIDERDELIVERYNOTE_OBGYN_ALL_OB_FT
Vaginal Delivery Summary    Procedure: Vacuum assisted vaginal delivery   Findings: Viable female infant delivered in cephalic presentation  Apgar scores 9/9.   Weight 3320g  Laceration(s): episiotomy  Repair: 2-0 chromic  EBL: 400  Complications: Episiotomy Vaginal Delivery Summary    Procedure: vaginal birth after    Findings: Viable female infant delivered in cephalic presentation  Apgar scores 9/9.   Weight 3320g  Laceration(s): episiotomy  Repair: 2-0 chromic  EBL: 400  Complications: Episiotomy

## 2024-06-10 NOTE — OB PROVIDER DELIVERY SUMMARY - NSLOWPPHRISK_OBGYN_A_OB
Berry Pregnancy/Less than or equal to 4 previous vaginal births/No known bleeding disorder/No history of postpartum hemorrhage/No other PPH risks indicated

## 2024-06-10 NOTE — OB PROVIDER H&P - NSHPPHYSICALEXAM_GEN_ALL_CORE
Vital Signs Last 24 Hrs  T(C): 36.6 (10 Jay 2024 00:10), Max: 36.6 (09 Jun 2024 23:33)  T(F): 97.9 (10 Jay 2024 00:10), Max: 97.9 (09 Jun 2024 23:33)  HR: 120 (10 Jay 2024 00:12) (113 - 120)  BP: 111/79 (10 Jay 2024 00:12) (111/79 - 114/70)  RR: 20 (10 Jay 2024 00:10) (20 - 20)    Parameters below as of 09 Jun 2024 23:33  Patient On (Oxygen Delivery Method): room air    Gen: well-appearing, NAD   Neuro: A&Ox3  Resp: breathing comfortably on RA  Abd: nontender, gravid  VE: 1.5/50/-3  FHT: baseline 145, mod variability, +accels, -decels  Mar-Mac: contractions q3-4m

## 2024-06-10 NOTE — DISCHARGE NOTE OB - HOSPITAL COURSE
She is now a  who underwent a  at 39w6d GA. Her  was complicated by 4th degree laceration. She is s/p antibiotics, sent home with bowel regimen medications. Otherwise uncomplicated postpartum course. Upon discharge she was passing gas, tolerating PO, ambulating, and voiding spontaneously.

## 2024-06-10 NOTE — OB RN DELIVERY SUMMARY - NS_SEPSISRSKCALC_OBGYN_ALL_OB_FT
EOS calculated successfully. EOS Risk Factor: 0.5/1000 live births ([facility] national incidence); GA=39w6d; Temp=98.78; ROM=6.683; GBS='Negative'; Antibiotics='No antibiotics or any antibiotics < 2 hrs prior to birth'

## 2024-06-10 NOTE — OB PROVIDER LABOR PROGRESS NOTE - NS_OBIHIFHRDETAILS_OBGYN_ALL_OB_FT
Baseline 180s, mod trent, +accel, prolonged decel
Baseline 150bpm, mod trent, +accel, -decel
FHR discontinuous. minimal variability
150bpm +accels no decels mod trent

## 2024-06-10 NOTE — OB PROVIDER LABOR PROGRESS NOTE - NS_SUBJECTIVE/OBJECTIVE_OBGYN_ALL_OB_FT
Patient seen at bedside. FHR tracing discontinuous.  Steven felt she had panic attack after Epidural bolus. States it is improving now. HR note to be elevated 120s.    Patient denies abdominal pain, vaginal bleeding or pressure.
resident to bedside, pt s/p epidural

## 2024-06-10 NOTE — DISCHARGE NOTE OB - PATIENT PORTAL LINK FT
You can access the FollowMyHealth Patient Portal offered by [facility] by registering at the following website: [email or web address]. By joining [employer]’s FollowMyHealth portal, you will also be able to view your health information using other applications (apps) compatible with our system.

## 2024-06-10 NOTE — DISCHARGE NOTE OB - CARE PLAN
Principal Discharge DX:	Vaginal birth after   Assessment and plan of treatment:	Please call your provider and make your postpartum follow-up appointments. Take medications as directed, regular diet, activity as tolerated. Exclusive breast feeding for the first 6 months is recommended. Nothing per vagina for 6 weeks (incl. sex, douching, etc). If you have additional concerns, please inform your provider.   1

## 2024-06-10 NOTE — OB RN DELIVERY SUMMARY - NSSELHIDDEN_OBGYN_ALL_OB_FT
[NS_DeliveryRN_OBGYN_ALL_OB_FT:[id number]] [NS_DeliveryRN_OBGYN_ALL_OB_FT:[id number]],[NS_DeliveryAttending1_OBGYN_ALL_OB_FT:[id number]],[NS_DeliveryAssist1_OBGYN_ALL_OB_FT:[id number]],[NS_DeliveryAssist2_OBGYN_ALL_OB_FT:[id number]]

## 2024-06-10 NOTE — OB PROVIDER LABOR PROGRESS NOTE - ASSESSMENT
Cat 2 tracing  - Patient repositioned. IVF given. HR noted to be elevated, but normotensive, afebrile and Saturating well on RA.  - Discussed possible need for internal monitoring if unable to trace FHR.   - continue to monitor closely    D/w Dr. Bland 
Exam as above  Maternal tachycardia noted to 150s  Prolonged decel with loss of contact, exact duration unknown    Cat II tracing, considering STAT rCS if FHR does not improve   Patient endorsing that she does not want a CS at this time.     Discussed with Dr. Bland
-Cat I FHT  -pt making cervical change on her own  -Station -3, plan for AROM once station is lower and head is better applied  -Continue expectant management
Exam as above  AROM mec fluid     Expt mgmt for now  Cat I tracing

## 2024-06-10 NOTE — OB PROVIDER H&P - INTERNATIONAL TRAVEL
Patient is a 87y old  Male who presents with a chief complaint of traumatic SAH found down (05 Dec 2019 13:16)      HPI:  88 yo male pmhx DM  BIBEMS after being found down for unknown period of time by doorman who called ambulance for AMS. On admission patient noted to have scalp lacerations and wrist abrasion, CTH c/w small traumatic SAH with small SDH component. Patient is a poor historian, unable to provide ROS. Denies any pain. No family with patient. Minimal collateral EMR information. (28 Nov 2019 17:46)      PAST MEDICAL & SURGICAL HISTORY:  DM (diabetes mellitus)  H/O hernia repair      FAMILY HISTORY:  No pertinent family history in first degree relatives      SOCIAL HISTORY:  Smoking Status: [ ] Current, [ ] Former, [ ] Never  Pack Years:    MEDICATIONS:  Pulmonary:    Antimicrobials:  piperacillin/tazobactam IVPB.. 4.5 Gram(s) IV Intermittent every 6 hours  valACYclovir 1000 milliGRAM(s) Oral every 8 hours  vancomycin  IVPB        Anticoagulants:    Onc:    GI/:  bisacodyl 5 milliGRAM(s) Oral at bedtime  senna 2 Tablet(s) Oral at bedtime    Endocrine:  dextrose 40% Gel 15 Gram(s) Oral once PRN  dextrose 50% Injectable 12.5 Gram(s) IV Push once  glucagon  Injectable 1 milliGRAM(s) IntraMuscular once PRN  insulin glargine Injectable (LANTUS) 16 Unit(s) SubCutaneous at bedtime  insulin lispro (HumaLOG) corrective regimen sliding scale   SubCutaneous Before meals and at bedtime    Cardiac:  hydrALAZINE Injectable 10 milliGRAM(s) IV Push every 1 hour PRN  labetalol Injectable 10 milliGRAM(s) IV Push every 1 hour PRN    Other Medications:  acetaminophen    Suspension .. 650 milliGRAM(s) Oral every 6 hours PRN  dextrose 5%. 1000 milliLiter(s) IV Continuous <Continuous>      Allergies    No Known Allergies    Intolerances        Review of Systems:   •	General: negative  •	Skin/Breast: negative  •	Ophthalmologic: negative  •	ENMT: negative  •	Respiratory and Thorax: negative  •	Cardiovascular: negative  •	Gastrointestinal: negative  •	Genitourinary: negative  •	Musculoskeletal: negative  •	Neurological: negative  •	Psychiatric: negative  •	Hematology/Lymphatics: negative  •	Endocrine: negative  •	Allergic/Immunologic: negative    Physical Exam:   • Constitutional:	Well-developed, well nourished  • Eyes:	EOMI; PERRL; no drainage or redness  • ENMT:	No oral lesions; no gross abnormalities  • Neck	No bruits; no thyromegaly or nodules  • Breasts:	not examined  • Back:	No deformity or limitation of movement  • Respiratory:	Breath Sounds equal & clear to percussion & auscultation, no accessory muscle use  • Cardiovascular:	Regular rate & rhythm, normal S1, S2; no murmurs, gallops or rubs; no S3, S4  • Gastrointestinal:	Soft, non-tender, no hepatosplenomegaly, normal bowel sounds  • Genitourinary:	not examined  • Rectal: not examined  • Extremities:	No cyanosis, clubbing or edema  • Vascular:	Equal and normal pulses (carotid, femoral, dorsalis pedis)  • Neurologica:l	not examined  • Skin:	No lesions; no rash  • Lymph Nodes:	No lymphadedenopathy  • Musculoskeletal:	No joint pain, swelling or deformity; no limitation of movement      Vital Signs Last 24 Hrs  T(C): 37.2 (05 Dec 2019 22:06), Max: 38.3 (05 Dec 2019 17:00)  T(F): 98.9 (05 Dec 2019 22:06), Max: 100.9 (05 Dec 2019 17:00)  HR: 107 (05 Dec 2019 21:32) (92 - 126)  BP: 109/49 (05 Dec 2019 21:00) (90/43 - 162/70)  BP(mean): 69 (05 Dec 2019 21:00) (61 - 111)  RR: 20 (05 Dec 2019 21:32) (17 - 36)  SpO2: 96% (05 Dec 2019 21:32) (91% - 97%)    12-04 @ 07:01 - 12-05 @ 07:00  --------------------------------------------------------  IN: 1475 mL / OUT: 1350 mL / NET: 125 mL    12-05 @ 07:01  -  12-05 @ 23:07  --------------------------------------------------------  IN: 1810 mL / OUT: 451 mL / NET: 1359 mL          LABS:      CBC Full  -  ( 05 Dec 2019 13:44 )  WBC Count : 11.07 K/uL  RBC Count : 3.64 M/uL  Hemoglobin : 11.3 g/dL  Hematocrit : 35.6 %  Platelet Count - Automated : 160 K/uL  Mean Cell Volume : 97.8 fl  Mean Cell Hemoglobin : 31.0 pg  Mean Cell Hemoglobin Concentration : 31.7 gm/dL  Auto Neutrophil # : x  Auto Lymphocyte # : x  Auto Monocyte # : x  Auto Eosinophil # : x  Auto Basophil # : x  Auto Neutrophil % : x  Auto Lymphocyte % : x  Auto Monocyte % : x  Auto Eosinophil % : x  Auto Basophil % : x    12-05    138  |  102  |  19  ----------------------------<  180<H>  4.3   |  25  |  0.43<L>    Ca    8.9      05 Dec 2019 13:44  Phos  2.3     12-04  Mg     2.1     12-04      CXR New RLL pnemonia                    RADIOLOGY & ADDITIONAL STUDIES (The following images were personally reviewed):
No

## 2024-06-10 NOTE — DISCHARGE NOTE OB - MEDICATION SUMMARY - MEDICATIONS TO TAKE
I will START or STAY ON the medications listed below when I get home from the hospital:    ibuprofen 100 mg/5 mL oral suspension  -- 30 milliliter(s) by mouth every 6 hours as needed for Mild Pain (1 - 3), Moderate Pain (4 - 6)  -- Indication: For Pain    acetaminophen 160 mg/5 mL oral suspension  -- 30.47 milliliter(s) by mouth every 6 hours as needed for Mild Pain (1 - 3), Moderate Pain (4 - 6)  -- Indication: For Pain    Prenatal Multivitamins with Folic Acid 1 mg oral tablet  -- 1 tab(s) by mouth once a day  -- Indication: For Postpartum recovery    senna leaf extract oral tablet  -- 2 tab(s) by mouth once a day (at bedtime)  -- Indication: For constipation    polyethylene glycol 3350 oral powder for reconstitution  -- 17 gram(s) by mouth once a day  -- Indication: For constipation

## 2024-06-10 NOTE — DISCHARGE NOTE OB - CARE PROVIDER_API CALL
Casey Hollis  Obstetrics and Gynecology  [address], [address]  [place] [address]  Phone: [phone]  Fax: [phone]  Established Patient  Follow Up Time: 2 weeks

## 2024-06-10 NOTE — DISCHARGE NOTE OB - PLAN OF CARE
Please call your provider and make your postpartum follow-up appointments. Take medications as directed, regular diet, activity as tolerated. Exclusive breast feeding for the first 6 months is recommended. Nothing per vagina for 6 weeks (incl. sex, douching, etc). If you have additional concerns, please inform your provider.

## 2024-06-10 NOTE — OB PROVIDER H&P - HISTORY OF PRESENT ILLNESS
29y  @39.6w GA presents c/o painful contractions q5m since  this evening. Denies leakage of fluid, vaginal bleeding, or decreased fetal movement. States she had a prior  section at 2cm in [place], states that she wasn't given enough time to labor.     Reports an uncomplicated pregnancy.    POb: CSx1  PGyn: denies  PMH/PSH: CSx1  Meds: pnv  All: pcn (anaphylaxis)   SH: denies x3    24 sonogram: vtx, anterior placenta, EFW 3067g (87%ile)

## 2024-06-10 NOTE — DISCHARGE NOTE OB - NS MD DC FALL RISK RISK
For information on Fall & Injury Prevention, visit: [email or web address] OR  [email or web address] OR  https://www.cdc.gov/steadi/patient.html

## 2024-06-10 NOTE — OB PROVIDER DELIVERY SUMMARY - NSSELHIDDEN_OBGYN_ALL_OB_FT
[NS_DeliveryRN_OBGYN_ALL_OB_FT:[id number]],[NS_DeliveryAttending1_OBGYN_ALL_OB_FT:[id number]],[NS_DeliveryAssist1_OBGYN_ALL_OB_FT:[id number]=]

## 2024-06-10 NOTE — OB RN PATIENT PROFILE - FALL HARM RISK - UNIVERSAL INTERVENTIONS
Bed in lowest position, wheels locked, appropriate side rails in place/Call bell, personal items and telephone in reach/Instruct patient to call for assistance before getting out of bed or chair/Non-slip footwear when patient is out of bed/[place] to call system/Physically safe environment - no spills, clutter or unnecessary equipment/Purposeful Proactive Rounding/Room/bathroom lighting operational, light cord in reach

## 2024-06-11 DIAGNOSIS — O34.219 MATERNAL CARE FOR UNSPECIFIED TYPE SCAR FROM PREVIOUS CESAREAN DELIVERY: ICD-10-CM

## 2024-06-11 DIAGNOSIS — O35.9XX0 MATERNAL CARE FOR (SUSPECTED) FETAL ABNORMALITY AND DAMAGE, UNSPECIFIED, NOT APPLICABLE OR UNSPECIFIED: ICD-10-CM

## 2024-06-11 DIAGNOSIS — D62 ACUTE POSTHEMORRHAGIC ANEMIA: ICD-10-CM

## 2024-06-11 LAB
HCT VFR BLD CALC: 19.2 % — CRITICAL LOW (ref 34.5–45)
HGB BLD-MCNC: 6.1 G/DL — CRITICAL LOW (ref 11.5–15.5)
T PALLIDUM AB TITR SER: NEGATIVE — SIGNIFICANT CHANGE UP

## 2024-06-11 RX ORDER — ACETAMINOPHEN 500 MG
30.47 TABLET ORAL
Qty: 853.16 | Refills: 0
Start: 2024-06-11 | End: 2024-06-17

## 2024-06-11 RX ORDER — POLYETHYLENE GLYCOL 3350 17 G/17G
17 POWDER, FOR SOLUTION ORAL
Qty: 119 | Refills: 0
Start: 2024-06-11 | End: 2024-06-17

## 2024-06-11 RX ORDER — IBUPROFEN 200 MG
30 TABLET ORAL
Qty: 840 | Refills: 0
Start: 2024-06-11 | End: 2024-06-17

## 2024-06-11 RX ORDER — SENNA PLUS 8.6 MG/1
2 TABLET ORAL
Qty: 14 | Refills: 0
Start: 2024-06-11 | End: 2024-06-17

## 2024-06-11 RX ORDER — IBUPROFEN 200 MG
600 TABLET ORAL EVERY 6 HOURS
Refills: 0 | Status: DISCONTINUED | OUTPATIENT
Start: 2024-06-11 | End: 2024-06-12

## 2024-06-11 RX ADMIN — Medication 100 MILLIGRAM(S): at 04:45

## 2024-06-11 RX ADMIN — Medication 975 MILLIGRAM(S): at 22:02

## 2024-06-11 RX ADMIN — Medication 975 MILLIGRAM(S): at 09:13

## 2024-06-11 RX ADMIN — Medication 600 MILLIGRAM(S): at 17:34

## 2024-06-11 RX ADMIN — POLYETHYLENE GLYCOL 3350 17 GRAM(S): 17 POWDER, FOR SOLUTION ORAL at 17:37

## 2024-06-11 RX ADMIN — Medication 600 MILLIGRAM(S): at 12:45

## 2024-06-11 RX ADMIN — Medication 100 MILLIGRAM(S): at 13:19

## 2024-06-11 RX ADMIN — Medication 600 MILLIGRAM(S): at 00:04

## 2024-06-11 RX ADMIN — Medication 600 MILLIGRAM(S): at 12:10

## 2024-06-11 RX ADMIN — Medication 1 TABLET(S): at 17:36

## 2024-06-11 RX ADMIN — Medication 600 MILLIGRAM(S): at 11:53

## 2024-06-11 RX ADMIN — SENNA PLUS 2 TABLET(S): 8.6 TABLET ORAL at 00:05

## 2024-06-11 RX ADMIN — Medication 975 MILLIGRAM(S): at 02:58

## 2024-06-11 NOTE — PROGRESS NOTE ADULT - SUBJECTIVE AND OBJECTIVE BOX
INTERVAL HPI/OVERNIGHT EVENTS:  29y Female s/p labor epidural, on 06/10/24    Vital Signs Last 24 Hrs  T(C): 36.9 (11 Jun 2024 12:00), Max: 36.9 (11 Jun 2024 12:00)  T(F): 98.4 (11 Jun 2024 12:00), Max: 98.4 (11 Jun 2024 12:00)  HR: 115 (11 Jun 2024 12:00) (99 - 177)  BP: 96/57 (11 Jun 2024 12:00) (91/58 - 142/78)  BP(mean): --  RR: 18 (11 Jun 2024 12:00) (17 - 19)  SpO2: 96% (11 Jun 2024 12:00) (78% - 100%)    Parameters below as of 11 Jun 2024 12:00  Patient On (Oxygen Delivery Method): room air            Patient's overall anesthesia experience:  satisfied    Patient seen and doing well     No headache      No residual numbness or weakness, sensory and motor function intact    No anesthetic complications or complaints noted or reported

## 2024-06-11 NOTE — PROGRESS NOTE ADULT - ASSESSMENT
ASSESSMENT:   CLYDE CARMEN is a 29y  PPD1 s/p  at 39wk6d c/b episiotomy. Patient has no other complaints at this time, is otherwise clinically and hemodynamically stable.   [place] girl is with patient at bedside  Hgb: 9.2 > 7.2 >asymptomatic    #Postpartum  - Continue routine post-partum care  - Pain management PRN  - Encourage maternal- bonding  - Bowel regimen  - Clinda/Gent for 24 hours    - Diet: Regular, advance as tolerated  - DVT ppx: Ambulation encouraged  - Dispo: cont monitoring   ASSESSMENT:   CLYDE CARMEN is a 29y  PPD1 s/p  at 39wk6d c/b episiotomy. Patient has no other complaints at this time, is otherwise clinically and hemodynamically stable.   [place] girl is with patient at bedside  Hgb: 9.2 > 7.2 s/p IV iron >6.1 c/o dizziness, consents to blood transfusion    #Postpartum  - Continue routine post-partum care  - Pain management PRN  - Encourage maternal- bonding  - Bowel regimen  - Clinda/Gent for 24 hours    - Diet: Regular, advance as tolerated  - DVT ppx: Ambulation encouraged  - Dispo: cont monitoring

## 2024-06-11 NOTE — PROGRESS NOTE ADULT - ATTENDING COMMENTS
29y  PPD1 s/p  at 39wk6d c/b episiotomy and fourth degree laceration with postpartum hemorrhage due to laceration, now with symptomatic anemia despite iron transfusion. Plan transfusion. Encouraged to continue on stool softeners. 29y  PPD1 s/p  at 39wk6d c/b episiotomy with postpartum hemorrhage due to laceration, now with symptomatic anemia despite iron transfusion. Plan transfusion. Encouraged to continue on stool softeners.

## 2024-06-11 NOTE — PROGRESS NOTE ADULT - SUBJECTIVE AND OBJECTIVE BOX
SUBJECTIVE:  Patient seen and examined at bedside this morning. No acute events overnight. Reports feeling well this morning, pain is well controlled with PRN pain medication. Tolerating PO without N/V, voiding spontaneously with no complaints.   Denies fevers, chills, shortness of breath, chest pain, headaches, vision changes or calf pain.     OBJECTIVE:  Vital Signs Last 24 Hrs  T(C): 36.5 (11 Jun 2024 03:29), Max: 37.1 (10 Jay 2024 13:29)  T(F): 97.7 (11 Jun 2024 03:29), Max: 98.78 (10 Jay 2024 13:29)  HR: 99 (11 Jun 2024 03:29) (97 - 177)  BP: 91/58 (11 Jun 2024 03:29) (91/58 - 159/70)    Physical exam:  General: AOx3, NAD.  Lungs: Breathing comfortably on RA  Abdomen: +BS, Soft, appropriately tender, nondistended, no guarding or rebound tenderness, firm uterine fundus at umbilicus  Ext: BL LE reveal minimal swelling, no popliteal tenderness or skin changes.     LABS:                        7.2    21.00 )-----------( 309      ( 10 Jay 2024 17:20 )             22.7       Antibody Screen: NEG (06-10-24 @ 00:14)     SUBJECTIVE:  Patient seen and examined at bedside this morning. No acute events overnight. Reports feeling well this morning, pain is well controlled with PRN pain medication. Tolerating PO without N/V, voiding spontaneously with no complaints.   Denies fevers, chills, shortness of breath, chest pain, headaches, vision changes or calf pain.     OBJECTIVE:  Vital Signs Last 24 Hrs  T(C): 36.5 (11 Jun 2024 03:29), Max: 37.1 (10 Jay 2024 13:29)  T(F): 97.7 (11 Jun 2024 03:29), Max: 98.78 (10 Jay 2024 13:29)  HR: 99 (11 Jun 2024 03:29) (97 - 177)  BP: 91/58 (11 Jun 2024 03:29) (91/58 - 159/70)    Physical exam:  General: AOx3, NAD.  Lungs: Breathing comfortably on RA  Abdomen: +BS, Soft, appropriately tender, nondistended, no guarding or rebound tenderness, firm uterine fundus at umbilicus  Ext: BL LE reveal minimal swelling, no popliteal tenderness or skin changes.     LABS:                        7.2    21.00 )-----------( 309      ( 10 Jay 2024 17:20 )             22.7       Antibody Screen: NEG (06-10-24 @ 00:14)                            6.1    x     )-----------( x        ( 11 Jun 2024 05:30 )             19.2

## 2024-06-12 VITALS
TEMPERATURE: 98 F | DIASTOLIC BLOOD PRESSURE: 66 MMHG | HEART RATE: 95 BPM | SYSTOLIC BLOOD PRESSURE: 96 MMHG | RESPIRATION RATE: 18 BRPM | OXYGEN SATURATION: 97 %

## 2024-06-12 LAB
HCT VFR BLD CALC: 23.1 % — LOW (ref 34.5–45)
HGB BLD-MCNC: 7.6 G/DL — LOW (ref 11.5–15.5)
MCHC RBC-ENTMCNC: 27.4 PG — SIGNIFICANT CHANGE UP (ref 27–34)
MCHC RBC-ENTMCNC: 32.9 GM/DL — SIGNIFICANT CHANGE UP (ref 32–36)
MCV RBC AUTO: 83.4 FL — SIGNIFICANT CHANGE UP (ref 80–100)
PLATELET # BLD AUTO: 322 K/UL — SIGNIFICANT CHANGE UP (ref 150–400)
RBC # BLD: 2.77 M/UL — LOW (ref 3.8–5.2)
RBC # FLD: 14.2 % — SIGNIFICANT CHANGE UP (ref 10.3–14.5)
WBC # BLD: 14.11 K/UL — HIGH (ref 3.8–10.5)
WBC # FLD AUTO: 14.11 K/UL — HIGH (ref 3.8–10.5)

## 2024-06-12 PROCEDURE — P9016: CPT

## 2024-06-12 PROCEDURE — 85027 COMPLETE CBC AUTOMATED: CPT

## 2024-06-12 PROCEDURE — 86780 TREPONEMA PALLIDUM: CPT

## 2024-06-12 PROCEDURE — 86923 COMPATIBILITY TEST ELECTRIC: CPT

## 2024-06-12 PROCEDURE — 59050 FETAL MONITOR W/REPORT: CPT

## 2024-06-12 PROCEDURE — 93005 ELECTROCARDIOGRAM TRACING: CPT

## 2024-06-12 PROCEDURE — 36430 TRANSFUSION BLD/BLD COMPNT: CPT

## 2024-06-12 PROCEDURE — 36415 COLL VENOUS BLD VENIPUNCTURE: CPT

## 2024-06-12 PROCEDURE — 76815 OB US LIMITED FETUS(S): CPT

## 2024-06-12 PROCEDURE — 85025 COMPLETE CBC W/AUTO DIFF WBC: CPT

## 2024-06-12 PROCEDURE — 85014 HEMATOCRIT: CPT

## 2024-06-12 PROCEDURE — 85018 HEMOGLOBIN: CPT

## 2024-06-12 PROCEDURE — 86901 BLOOD TYPING SEROLOGIC RH(D): CPT

## 2024-06-12 PROCEDURE — 86850 RBC ANTIBODY SCREEN: CPT

## 2024-06-12 PROCEDURE — 86900 BLOOD TYPING SEROLOGIC ABO: CPT

## 2024-06-12 RX ORDER — IRON SUCROSE 20 MG/ML
200 INJECTION, SOLUTION INTRAVENOUS ONCE
Refills: 0 | Status: COMPLETED | OUTPATIENT
Start: 2024-06-12 | End: 2024-06-12

## 2024-06-12 RX ORDER — SENNA PLUS 8.6 MG/1
2 TABLET ORAL
Qty: 0 | Refills: 0 | DISCHARGE
Start: 2024-06-12

## 2024-06-12 RX ORDER — IRON SUCROSE 20 MG/ML
200 INJECTION, SOLUTION INTRAVENOUS ONCE
Refills: 0 | Status: DISCONTINUED | OUTPATIENT
Start: 2024-06-12 | End: 2024-06-12

## 2024-06-12 RX ORDER — POLYETHYLENE GLYCOL 3350 17 G/17G
17 POWDER, FOR SOLUTION ORAL
Qty: 0 | Refills: 0 | DISCHARGE
Start: 2024-06-12

## 2024-06-12 RX ADMIN — Medication 975 MILLIGRAM(S): at 09:41

## 2024-06-12 RX ADMIN — Medication 1 TABLET(S): at 12:51

## 2024-06-12 RX ADMIN — IRON SUCROSE 110 MILLIGRAM(S): 20 INJECTION, SOLUTION INTRAVENOUS at 10:48

## 2024-06-12 RX ADMIN — Medication 600 MILLIGRAM(S): at 12:55

## 2024-06-12 RX ADMIN — Medication 600 MILLIGRAM(S): at 12:52

## 2024-06-12 NOTE — PROGRESS NOTE ADULT - SUBJECTIVE AND OBJECTIVE BOX
SUBJECTIVE:  Patient seen and examined at bedside this morning. No acute events overnight. Reports feeling well this morning, pain is well controlled with PRN pain medication. Patient reports feeling better today after transfusion, she denies lightheadedness or weakness. She reports some lower back/flank pain since yesterday evening.     Vital Signs Last 24 Hrs  T(C): 37 (12 Jun 2024 04:39), Max: 37 (12 Jun 2024 04:39)  T(F): 98.6 (12 Jun 2024 04:39), Max: 98.6 (12 Jun 2024 04:39)  HR: 90 (12 Jun 2024 04:39) (90 - 124)  BP: 112/73 (12 Jun 2024 04:39) (92/58 - 112/73)               7.6    14.11 )-----------( 322      ( 12 Jun 2024 04:09 )             23.1       Physical exam:  General: AOx3, NAD.  Lungs: Breathing comfortably on RA  Abdomen: +BS, Soft, appropriately tender, nondistended, no guarding or rebound tenderness, firm uterine fundus at umbilicus  Ext: BL LE reveal minimal swelling, no popliteal tenderness or skin changes.       LABS:                          7.6    14.11 )-----------( 322      ( 12 Jun 2024 04:09 )             23.1

## 2024-06-12 NOTE — PROGRESS NOTE ADULT - ATTENDING COMMENTS
Pt is a  is a 29y  PPD2 s/p  at 39wk6d doing well.  Pt is s/p 1 unit of pRBC yesterday for symptomatic anemia.  She is feeling much better.  Her SOB and dizziness/lightheadedness resolved.  Her pain is well controlled and her bleeding is mild to moderate.    Will hold off on 2nd unit of pRBCs as pt is feeling better and her Hgb sergio appropriately from 6.1 to 7.6  Will give an additional dose of venofer.  continue routine PP care  Discharge planning     MERVAT Guzman (OB hospitalist)

## 2024-06-12 NOTE — PROGRESS NOTE ADULT - ASSESSMENT
ASSESSMENT:   CLYDE CARMEN is a 29y  PPD2 s/p  at 39wk6d c/b episiotomy. Patient has no other complaints at this time, is otherwise clinically and hemodynamically stable.   [place] girl is with patient at bedside  Hgb: 9.2 > 7.2 s/p IV iron > 6.1 > 1u > 7.6  Patient to receive 2nd unit today    #Postpartum  - Continue routine post-partum care  - Pain management PRN  - Encourage maternal- bonding  - Bowel regimen  - Clinda/Gent for 24 hours    #Anemia from blood loss  - Patient feeling better s/p 1u  - Patient to receive 2nd unit today    - Diet: Regular, advance as tolerated  - DVT ppx: Ambulation encouraged  - Dispo: cont monitoring   ASSESSMENT:   CLYDE CARMEN is a 29y  PPD2 s/p  at 39wk6d c/b episiotomy. Patient has no other complaints at this time, is otherwise clinically and hemodynamically stable.   [place] girl is with patient at bedside  Hgb: 9.2 > 7.2 s/p IV iron > 6.1 > 1u > 7.6  Patient to receive 2nd unit today    #Postpartum  - Continue routine post-partum care  - Pain management PRN  - Encourage maternal- bonding  - Bowel regimen  - Clinda/Gent for 24 hours    #Anemia from blood loss  - Patient feeling better s/p 1u  - Patient to receive 2nd unit if needed     - Diet: Regular, advance as tolerated  - DVT ppx: Ambulation encouraged  - Dispo: cont monitoring

## 2024-06-14 ENCOUNTER — EMERGENCY (EMERGENCY)
Facility: HOSPITAL | Age: 29
LOS: 1 days | Discharge: HOME | End: 2024-06-14
Attending: STUDENT IN AN ORGANIZED HEALTH CARE EDUCATION/TRAINING PROGRAM
Payer: MEDICAID

## 2024-06-14 VITALS
SYSTOLIC BLOOD PRESSURE: 115 MMHG | DIASTOLIC BLOOD PRESSURE: 77 MMHG | HEIGHT: 63 IN | WEIGHT: 142.86 LBS | RESPIRATION RATE: 20 BRPM | OXYGEN SATURATION: 100 % | HEART RATE: 99 BPM | TEMPERATURE: 98 F

## 2024-06-14 DIAGNOSIS — Z98.891 HISTORY OF UTERINE SCAR FROM PREVIOUS SURGERY: Chronic | ICD-10-CM

## 2024-06-14 PROCEDURE — 99283 EMERGENCY DEPT VISIT LOW MDM: CPT

## 2024-06-14 NOTE — ED PROVIDER NOTE - OBJECTIVE STATEMENT
29-year-old female past medical history including recent   presenting for infected IV site.  2 days ago patient had an IV in her left forearm.  Over the last 2 days has been increasingly tender and red.  Denying any fever, chills, nausea, vomiting, chest pain, shortness of breath, abdominal pain, vaginal discharge, foul-smelling discharged,  worsening lower abdominal pain.

## 2024-06-14 NOTE — ED PROVIDER NOTE - NSDCPRINTRESULTS_ED_ALL_ED
Patient requests all Lab, Cardiology, and Radiology Results on their Discharge Instructions A-T Advancement Flap Text: The defect edges were debeveled with a #15 scalpel blade.  Given the location of the defect, shape of the defect and the proximity to free margins an A-T advancement flap was deemed most appropriate.  Using a sterile surgical marker, an appropriate advancement flap was drawn incorporating the defect and placing the expected incisions within the relaxed skin tension lines where possible.    The area thus outlined was incised deep to adipose tissue with a #15 scalpel blade.  The skin margins were undermined to an appropriate distance in all directions utilizing iris scissors.

## 2024-06-14 NOTE — ED PROVIDER NOTE - PATIENT PORTAL LINK FT
You can access the FollowMyHealth Patient Portal offered by [facility] by registering at the following website: [email or web address]. By joining [employer]’s FollowMyHealth portal, you will also be able to view your health information using other applications (apps) compatible with our system.

## 2024-06-14 NOTE — ED PROVIDER NOTE - PHYSICAL EXAMINATION
General: well appearing, NAD  Head: NC, AT  EENT: EOMI, no scleral icterus  Cardiac: RRR, no apparent murmurs, no lower extremity edema  Respiratory: CTABL, no respiratory distress   Abdomen: soft, ND, NT, nonperitonitic  MSK/Vascular: full ROM, soft compartments, warm extremities,  8 cm area of redness and swelling without fluctuance, surrounding previous IV site  Neuro: AAOx3, sensation to light touch intact  Psych: calm, cooperative

## 2024-06-14 NOTE — ED PROVIDER NOTE - CLINICAL SUMMARY MEDICAL DECISION MAKING FREE TEXT BOX
29-year-old female past medical history including recent   presenting for infected IV site.   Hemodynamically stable.  No systemic signs or symptoms of infection. Ultrasound was used to confirm cobblestoning without clear pocket of fluid collection.  Will discharge with antibiotics.  Patient required clindamycin due to breast-feeding, anaphylactic reaction to penicillins, unable to take pills performed medications.  Antibiotics sent.

## 2024-06-14 NOTE — ED PROVIDER NOTE - ATTENDING CONTRIBUTION TO CARE
I, Scar Cee MD, personally saw the patient with the resident, and completed the key components of the history and physical exam. I then discussed the management plan with the resident.  Patient with no significant past medical history is presenting after having an IV in her left forearm status post her vaginal delivery of a couple days ago.  States that she feels that it becoming more tender and red since discharge.  Denies fevers or any discharge near the site.  On exam there is erythema without fluctuance surrounding the previous IV site.  She has an intact radial pulse.  Concern for likely cellulitis without evidence of abscess.  No diffuse swelling to suggest DVT.  Case discussed with pharmacist, recommending medication that would be safe for patient while breast-feeding.  Will give clindamycin and recommend outpatient follow-up.  Area of infection was circled with a skin marker to help her assess improvement in symptoms.

## 2024-06-14 NOTE — ED PROVIDER NOTE - NSFOLLOWUPINSTRUCTIONS_ED_ALL_ED_FT
Call tomorrow to schedule a follow up appointment with your primary care provider.    Follow up with your OB/GYN as scheduled.    Take the antibiotics 3 times a day for a week.    Come back for any new or worsening symptoms.    Cellulitis, Adult  A person's legs and feet. One leg is normal and the other leg is affected by cellulitis.  Cellulitis is a skin infection. The infected area is usually warm, red, swollen, and tender. It most commonly occurs on the lower body, such as the legs, feet, and toes, but this condition can occur on any part of the body. The infection can travel to the muscles, blood, and underlying tissue and become life-threatening without treatment. It is important to get medical treatment right away for this condition.    What are the causes?  Cellulitis is caused by bacteria. The bacteria enter through a break in the skin, such as a cut, burn, insect or animal bite, open sore, or crack.    What increases the risk?  This condition is more likely to occur in people who:  Have a weak body's defense system (immune system).  Are older than 60 years old.  Have diabetes.  Have a type of long-term (chronic) liver disease (cirrhosis) or kidney disease.  Are obese.  Have a skin condition such as:  An itchy rash, such as eczema or psoriasis.  A fungal rash on the feet or in skinfolds.  Blistering rashes, such as shingles or chickenpox.  Slow movement of blood in the veins (venous stasis).  Fluid buildup below the skin (edema).  Have open wounds on the skin, such as cuts, puncture wounds, burns, bites, scrapes, tattoos, piercings, or wounds from surgery.  Have had radiation therapy.  Use IV drugs.  What are the signs or symptoms?  Symptoms of this condition include:  Skin that looks red, purple, or slightly darker than your usual skin color.  Streaks or spots on the skin.  Swollen area of the skin.  Tenderness or pain when an area of the skin is touched.  Warm skin.  Fever or chills.  Blisters.  Tiredness (fatigue).  How is this diagnosed?  This condition is diagnosed based on a medical history and physical exam. You may also have tests, including:  Blood tests.  Imaging tests.  Tests on a sample of fluid taken from the wound (wound culture).  How is this treated?  Treatment for this condition may include:  Medicines. These may include antibiotics or medicines to treat allergies (antihistamines).  Rest.  Applying cold or warm wet cloths (compresses) to the skin.  If the condition is severe, you may need to stay in the hospital and get antibiotics through an IV.  The infection usually starts to get better within 1–2 days of treatment.    Follow these instructions at home:  Medicines    Take over-the-counter and prescription medicines only as told by your health care provider.  If you were prescribed antibiotics, take them as told by your provider. Do not stop using the antibiotic even if you start to feel better.  General instructions    Drink enough fluid to keep your pee (urine) pale yellow.  Do not touch or rub the infected area.  Raise (elevate) the infected area above the level of your heart while you are sitting or lying down.  Return to your normal activities as told by your provider. Ask your provider what activities are safe for you.  Apply warm or cold compresses to the affected area as told by your provider.  Keep all follow-up visits. Your provider will need to make sure that a more serious infection is not developing.  Contact a health care provider if:  You have a fever.  Your symptoms do not improve within 1–2 days of starting treatment or you develop new symptoms.  Your bone or joint underneath the infected area becomes painful after the skin has healed.  Your infection returns in the same area or another area. Signs of this may include:  You notice a swollen bump in the infected area.  Your red area gets larger, turns dark in color, or becomes more painful.  Drainage increases.  Pus or a bad smell develops in your infected area.  You have more pain.  You feel ill and have muscle aches and weakness.  You develop vomiting or diarrhea that will not go away.  Get help right away if:  You notice red streaks coming from the infected area.  You notice the skin turns purple or black and falls off.  This symptom may be an emergency. Get help right away. Call 911.  Do not wait to see if the symptom will go away.  Do not drive yourself to the hospital.  This information is not intended to replace advice given to you by your health care provider. Make sure you discuss any questions you have with your health care provider.

## 2024-06-19 ENCOUNTER — APPOINTMENT (OUTPATIENT)
Dept: OBGYN | Facility: CLINIC | Age: 29
End: 2024-06-19
Payer: MEDICAID

## 2024-06-19 VITALS
BODY MASS INDEX: 24.27 KG/M2 | SYSTOLIC BLOOD PRESSURE: 123 MMHG | WEIGHT: 137 LBS | DIASTOLIC BLOOD PRESSURE: 85 MMHG | HEIGHT: 63 IN

## 2024-06-19 PROCEDURE — 0503F POSTPARTUM CARE VISIT: CPT

## 2024-06-19 NOTE — PHYSICAL EXAM
[Chaperone Present] : A chaperone was present in the examining room during all aspects of the physical examination [FreeTextEntry2] : AB

## 2024-06-19 NOTE — HISTORY OF PRESENT ILLNESS
[Prior Menses:  ___ (Date)] : date of prior menstruation was [unfilled] [TextBox_4] : PT HERE FOR DELIVERY FOLLOW UP  LMP: PP, status post postpartum transfusion for uterine atony [LMPDate] : PP [TextBox_6] : PP [FreeTextEntry1] : PP

## 2024-06-25 LAB — SURGICAL PATHOLOGY STUDY: SIGNIFICANT CHANGE UP

## 2024-07-17 ENCOUNTER — APPOINTMENT (OUTPATIENT)
Dept: OBGYN | Facility: CLINIC | Age: 29
End: 2024-07-17

## 2024-07-19 ENCOUNTER — APPOINTMENT (OUTPATIENT)
Dept: OBGYN | Facility: CLINIC | Age: 29
End: 2024-07-19
Payer: MEDICAID

## 2024-07-19 VITALS — DIASTOLIC BLOOD PRESSURE: 69 MMHG | HEIGHT: 63 IN | SYSTOLIC BLOOD PRESSURE: 100 MMHG

## 2024-07-19 PROCEDURE — 99213 OFFICE O/P EST LOW 20 MIN: CPT

## 2024-07-22 ENCOUNTER — NON-APPOINTMENT (OUTPATIENT)
Age: 29
End: 2024-07-22

## 2024-07-26 LAB
HCT VFR BLD CALC: 43.4 %
HGB BLD-MCNC: 11.8 G/DL
MCHC RBC-ENTMCNC: 26.4 PG
MCHC RBC-ENTMCNC: 27.2 GM/DL
MCV RBC AUTO: 97.1 FL
PLATELET # BLD AUTO: 328 K/UL
RBC # BLD: 4.47 M/UL
RBC # FLD: 17 %
WBC # FLD AUTO: 5.21 K/UL

## 2024-08-05 ENCOUNTER — APPOINTMENT (OUTPATIENT)
Dept: OBGYN | Facility: CLINIC | Age: 29
End: 2024-08-05

## 2024-08-05 PROCEDURE — 99213 OFFICE O/P EST LOW 20 MIN: CPT

## 2024-08-05 NOTE — HISTORY OF PRESENT ILLNESS
[TextBox_4] : PT HERE FOR EPISIOTOMY CHECK  LMP: 8/4/24 [LMPDate] : 8/4/24 [TextBox_6] : 8/4/24 [FreeTextEntry1] : 8/4/24

## 2024-08-05 NOTE — PHYSICAL EXAM
[Chaperone Present] : A chaperone was present in the examining room during all aspects of the physical examination [FreeTextEntry2] : KF

## 2024-08-26 ENCOUNTER — APPOINTMENT (OUTPATIENT)
Dept: OBGYN | Facility: CLINIC | Age: 29
End: 2024-08-26
Payer: MEDICAID

## 2024-08-26 VITALS — BODY MASS INDEX: 23.92 KG/M2 | WEIGHT: 135 LBS | HEIGHT: 63 IN

## 2024-08-26 DIAGNOSIS — G89.18 OTHER ACUTE POSTPROCEDURAL PAIN: ICD-10-CM

## 2024-08-26 PROCEDURE — 99213 OFFICE O/P EST LOW 20 MIN: CPT

## 2024-08-26 NOTE — HISTORY OF PRESENT ILLNESS
[TextBox_4] : PT HERE FOR A FOLLOW UP ON EPISIOTOMY  LMP: 8/4/24 [LMPDate] : 8/4/24 [TextBox_6] : 8/4/24 [FreeTextEntry1] : 8/4/24

## 2024-10-09 ENCOUNTER — APPOINTMENT (OUTPATIENT)
Dept: OBGYN | Facility: CLINIC | Age: 29
End: 2024-10-09
Payer: MEDICAID

## 2024-10-09 VITALS
WEIGHT: 135 LBS | HEIGHT: 63 IN | SYSTOLIC BLOOD PRESSURE: 128 MMHG | HEART RATE: 130 BPM | DIASTOLIC BLOOD PRESSURE: 82 MMHG | BODY MASS INDEX: 23.92 KG/M2

## 2024-10-09 DIAGNOSIS — R10.2 PELVIC AND PERINEAL PAIN: ICD-10-CM

## 2024-10-09 DIAGNOSIS — Z34.90 ENCOUNTER FOR SUPERVISION OF NORMAL PREGNANCY, UNSPECIFIED, UNSPECIFIED TRIMESTER: ICD-10-CM

## 2024-10-09 DIAGNOSIS — Z87.42 PERSONAL HISTORY OF OTHER DISEASES OF THE FEMALE GENITAL TRACT: ICD-10-CM

## 2024-10-09 DIAGNOSIS — R39.9 UNSPECIFIED SYMPTOMS AND SIGNS INVOLVING THE GENITOURINARY SYSTEM: ICD-10-CM

## 2024-10-09 DIAGNOSIS — N91.2 AMENORRHEA, UNSPECIFIED: ICD-10-CM

## 2024-10-09 PROCEDURE — 99214 OFFICE O/P EST MOD 30 MIN: CPT

## 2024-10-17 DIAGNOSIS — Z33.2 ENCOUNTER FOR ELECTIVE TERMINATION OF PREGNANCY: ICD-10-CM

## 2024-10-22 ENCOUNTER — APPOINTMENT (OUTPATIENT)
Dept: OBGYN | Facility: CLINIC | Age: 29
End: 2024-10-22

## 2024-11-01 ENCOUNTER — APPOINTMENT (OUTPATIENT)
Dept: OBGYN | Facility: CLINIC | Age: 29
End: 2024-11-01

## 2024-11-01 ENCOUNTER — APPOINTMENT (OUTPATIENT)
Dept: OBGYN | Facility: CLINIC | Age: 29
End: 2024-11-01
Payer: MEDICAID

## 2024-11-01 ENCOUNTER — ASOB RESULT (OUTPATIENT)
Age: 29
End: 2024-11-01

## 2024-11-01 DIAGNOSIS — O03.9 COMPLETE OR UNSPECIFIED SPONTANEOUS ABORTION W/OUT COMPLICATION: ICD-10-CM

## 2024-11-01 PROCEDURE — 76856 US EXAM PELVIC COMPLETE: CPT | Mod: 59

## 2024-11-01 PROCEDURE — 99213 OFFICE O/P EST LOW 20 MIN: CPT

## 2024-11-01 PROCEDURE — 93975 VASCULAR STUDY: CPT

## 2024-11-01 PROCEDURE — 76830 TRANSVAGINAL US NON-OB: CPT

## 2024-11-04 LAB
BASOPHILS # BLD AUTO: 0.04 K/UL
BASOPHILS NFR BLD AUTO: 0.5 %
EOSINOPHIL # BLD AUTO: 0.16 K/UL
EOSINOPHIL NFR BLD AUTO: 2.2 %
HCT VFR BLD CALC: 38.9 %
HGB BLD-MCNC: 11.9 G/DL
IMM GRANULOCYTES NFR BLD AUTO: 0.3 %
LYMPHOCYTES # BLD AUTO: 2.03 K/UL
LYMPHOCYTES NFR BLD AUTO: 27.7 %
MAN DIFF?: NORMAL
MCHC RBC-ENTMCNC: 27.2 PG
MCHC RBC-ENTMCNC: 30.6 G/DL
MCV RBC AUTO: 88.8 FL
MONOCYTES # BLD AUTO: 0.54 K/UL
MONOCYTES NFR BLD AUTO: 7.4 %
NEUTROPHILS # BLD AUTO: 4.54 K/UL
NEUTROPHILS NFR BLD AUTO: 61.9 %
PLATELET # BLD AUTO: 458 K/UL
RBC # BLD: 4.38 M/UL
RBC # FLD: 14.6 %
WBC # FLD AUTO: 7.33 K/UL

## 2024-11-26 DIAGNOSIS — Z33.2 ENCOUNTER FOR ELECTIVE TERMINATION OF PREGNANCY: ICD-10-CM

## 2024-11-26 DIAGNOSIS — O03.9 COMPLETE OR UNSPECIFIED SPONTANEOUS ABORTION W/OUT COMPLICATION: ICD-10-CM

## 2024-11-26 DIAGNOSIS — Z87.42 PERSONAL HISTORY OF OTHER DISEASES OF THE FEMALE GENITAL TRACT: ICD-10-CM

## 2024-11-26 DIAGNOSIS — G89.18 OTHER ACUTE POSTPROCEDURAL PAIN: ICD-10-CM

## 2024-12-02 NOTE — OB RN PATIENT PROFILE - ARE SIGNIFICANT INDICATORS COMPLETE.
ICU  Critical Care APRN Progress Note    NAME: Brenden Cardenas - ROOM: 468/[facility] - MRN: [id number] - Age: 75 year old - :1949    SUBJECTIVE: Weaning levophed gtt. Lethargic today and abdomen more firm and distended with pain .      Current Facility-Administered Medications   Medication Dose Route Frequency    lactated ringers infusion   Intravenous Continuous    vancomycin (Vancocin) cap 125 mg  125 mg Oral QID    metroNIDAZOLE in sodium chloride 0.79% (Flagyl) 5 mg/mL IVPB premix 500 mg  500 mg Intravenous Q8H    heparin (Porcine) 5000 UNIT/ML injection 5,000 Units  5,000 Units Subcutaneous Q8H TONYA    norepinephrine (Levophed) 4 mg/250mL infusion premix  0.5-50 mcg/min Intravenous Continuous    norepinephrine (Levophed) 4 mg/250mL infusion premix  0.5-50 mcg/min Intravenous Continuous PRN    vasopressin (Vasostrict) 20 Units in sodium chloride 0.9% 100 mL infusion for septic shock  0.03 Units/min Intravenous Continuous PRN    piperacillin-tazobactam (Zosyn) 4.5 g in dextrose 5% 100 mL IVPB-ADDV  4.5 g Intravenous Q8H    vancomycin (Vancocin) 1,000 mg in sodium chloride 0.9% 250 mL IVPB-ADDV  15 mg/kg Intravenous Q24H    midodrine (ProAmatine) tab 5 mg  5 mg Oral TID     OBJECTIVE  Vitals:  /57   Pulse 100   Temp 98 °F (36.7 °C) (Temporal)   Resp 16   Wt 129 lb 13.6 oz (58.9 kg)   SpO2 96%   BMI 19.18 kg/m²             RA    Physical Exam:    General Appearance: Alert, cooperative, no acute distress, oriented to self only  Neck: No JVD  Lungs: Clear to auscultation bilaterally, respirations unlabored  Heart: Regular rate and rhythm, S1 and S2 normal, no murmur, rub or gallop  Abdomen: distended, firm, tenderness to light palpation, bowel sounds active  Extremities: Atraumatic, 2+ pitting edema to bilateral fee, capillary refill <3 sec., multiple pressure ulcer wounds to bilateral LEs and sacrum, back, scrotal , heels (See pictures in Media)  Pulses: 2+ and symmetric all extremities  Skin: Skin  color, texture, turgor normal for ethnicity, no rashes or lesions, warm and dry  Neurologic: CNII-XII intact, normal strength    Data this admission:  CT ABDOMEN+PELVIS(CONTRAST ONLY)(CPT=74177)    Result Date: 12/1/2024  CONCLUSION:  1. Development of a large amount of dense stool within the rectum with sterile colitis with some wall edema and pericolonic/Ashlee rectal edema noted.  Moderate amount of upstream stool noted.  No definite obstruction. 2. The previously noted abscess in the right pelvis/iliacus ileus psoas region has resolved. 3. Additional nonemergent findings as described above which appears stable. 4. Mild distention of the gallbladder without wall thickening or gallstones.  No biliary tree dilation.    LOCATION:  [facility]   Dictated by (CST): Gary Toro MD on 12/01/2024 at 12:37 PM     Finalized by (CST): Gary Toro MD on 12/01/2024 at 12:44 PM       XR CHEST AP PORTABLE  (CPT=71045)    Result Date: 12/1/2024  CONCLUSION:  The patient is again noted to be markedly rotated to the right.  Marked degenerative change of the spine are noted.  Postsurgical changes lower cervical spine again noted. The heart size appears to be within normal limits with normal vascularity without effusion. There is some minimal reticular changes in the right infrahilar region and ground-glass opacities in the right retrocardiac region which are improved compared to the study of 10/25/2024 which may represent interstitial pneumonitis, fibrosis, or atelectasis.  Stable slight elevation the right hemidiaphragm.   LOCATION:  [facility]      Dictated by (CST): Gary Toro MD on 12/01/2024 at 11:12 AM     Finalized by (CST): Gary Toro MD on 12/01/2024 at 11:13 AM           Labs:  Lab Results   Component Value Date    WBC 46.4 12/02/2024    HGB 9.2 12/02/2024    HCT 29.9 12/02/2024    .0 12/02/2024    CREATSERUM 0.49 12/02/2024    BUN 29 12/02/2024     12/02/2024    K 4.1 12/02/2024      12/02/2024    CO2 20.0 12/02/2024     12/02/2024    CA 8.1 12/02/2024    ALB 2.1 12/02/2024    ALKPHO 72 12/02/2024    BILT 0.3 12/02/2024    TP 3.8 12/02/2024    AST 11 12/02/2024    ALT 7 12/02/2024    MG 1.8 12/02/2024    PHOS 4.6 12/02/2024       Assessment/Plan:  Brenden Cardenas is a 75 year old male with PMHx significant for ancxiety, gout, cervical stenosis, HTN, MS stage IV, multiple pressure ulcers, recent bacteremia--bacteroides fragilis and pseudomonas aeroginosa bacteremia, retroperitoneal abscess s/p drainage and completion of meropnem x14 days, neurogenic bladder with chronic noriega who presented to the ED with complaints of diarrhea. Admitted to ICU for septic shock due to Cdiff with sterile colitis, possible UTI and multiple chronic wounds. On levophed gtt and received 6L IVFs.      Shock: Likely multifactorial; Septic due to Cdiff with stercoral, possible UTI. +/- multiple chronic wounds. Recent admit with bacteroides fragilis and pseudomonas aeroginosa bacteremia, retroperitoneal abscess s/p drainage and completion of meropenem x14 days. Hypovolemic given poor po intake x1 week and diarrhea x5 days  -Leukocytosis- improving, hypothermic- 95.1 in ED, now normothermic  -s/p 6 L IVF bolus in ED for sepsis  - LA 3.3-->2.3, now cleared  -continue Zosyn (12/1) and Vanco (12/1-), IV flagyl (12/2-)  -Vasopressors for MAP > 65, SBP >90-currently on levophed gtt  -CT A/P with resolution of abscess, sterile colitis  -Cdiff positive--PO vanco ordered  -UA positive, cx pending--sent from chronic noriega  -B. Cxs pending  -EKG with TWIs in noemy-lateral and lateral leads. Troponin negative  -Consider stress dose steroids if pressor needs increase  -Anabella in place with Vigileo  -ID consulted    Diarrhea: Complains of 5 days PTA  -KUB pending given abdominal pain and tenderness today  -Cdiff positive  -Contact plus iso   -Stool panel negative  -IVFs  -Rectal tube placed given multiple wounds    NAGMA: Likely  due to GI losses/diarrhea  -Bicarb 20  -IVFs  -Monitor BMP    Neurogenic bladder  -Chronic noriega-exchanged in ICU    Multiple chronic pressure ulcer wounds  -Wound care consult  -Home health RN 2x week, ID MD 1x/week (Wednesdays)  -ID consult  -CM consult--may need more assistance at home    Malnutrition, Poor PO intake/appetite  -Regular diet  -If continues to have no appetite this admit, will need to consider other means of nutrition  -Supplements  -Dietary consult  -Monitor BMP, Mg and Phos    Multiple Sclerosis Stage IV, paraplegia  -Bedbound    Gout  -Allopurinol    Anemia  -Hgb stable  -No signs of active bleeding  -Daily CBC    HTN  -Hold home meds given shock    FEN  -IVFs  -Replete lytes PRN  -Dietary consult    Proph  -heparin subcutaneous  -Bedbound at baseline      Dispo:     Code Status: DNAR/Selective Treatment-confirmed with patient and wife  -ICU for close monitoring    Plan of care discussed with intensivist, Dr. Winston Magana, [facility]  ICU  Phone  [phone]   Pager [phone]           No Yes